# Patient Record
Sex: FEMALE | Race: WHITE | Employment: FULL TIME | URBAN - METROPOLITAN AREA
[De-identification: names, ages, dates, MRNs, and addresses within clinical notes are randomized per-mention and may not be internally consistent; named-entity substitution may affect disease eponyms.]

---

## 2017-01-03 ENCOUNTER — TELEPHONE (OUTPATIENT)
Dept: INTERNAL MEDICINE CLINIC | Facility: CLINIC | Age: 52
End: 2017-01-03

## 2017-01-03 DIAGNOSIS — B00.9 HERPES SIMPLEX: Primary | ICD-10-CM

## 2017-01-03 RX ORDER — VALACYCLOVIR HYDROCHLORIDE 1 G/1
1 TABLET, FILM COATED ORAL EVERY 12 HOURS SCHEDULED
Qty: 4 TABLET | Refills: 0 | Status: SHIPPED | OUTPATIENT
Start: 2017-01-03 | End: 2017-04-11

## 2017-01-03 NOTE — TELEPHONE ENCOUNTER
Spoke to pt. States she had hives that started Friday. Hives only on stomach and back of neck. Denies swelling of tongue or throat and no breathing difficulty. Hives resolved by Sunday. Pt takes Zyrtec daily.   Took Zantac 150mg on Sat and Sun as that

## 2017-01-03 NOTE — TELEPHONE ENCOUNTER
Patient had hives again this weekend - gone now. She bought zantac over the counter to take with her zyrtec. Also got a cold sore on her lip at the same time. Does she need to come in again since they are gone?   Please advise

## 2017-01-03 NOTE — TELEPHONE ENCOUNTER
Called and left message on cell number with below notes.   Informed pt Rx for Valacyclovir sent to [facility].

## 2017-01-26 PROBLEM — E05.00 GRAVES' DISEASE: Status: ACTIVE | Noted: 2017-01-26

## 2017-02-11 ENCOUNTER — APPOINTMENT (OUTPATIENT)
Dept: LAB | Age: 52
End: 2017-02-11
Attending: INTERNAL MEDICINE
Payer: COMMERCIAL

## 2017-02-11 PROCEDURE — 84439 ASSAY OF FREE THYROXINE: CPT | Performed by: INTERNAL MEDICINE

## 2017-02-11 PROCEDURE — 84443 ASSAY THYROID STIM HORMONE: CPT | Performed by: INTERNAL MEDICINE

## 2017-02-11 PROCEDURE — 36415 COLL VENOUS BLD VENIPUNCTURE: CPT | Performed by: INTERNAL MEDICINE

## 2017-04-04 ENCOUNTER — APPOINTMENT (OUTPATIENT)
Dept: LAB | Age: 52
End: 2017-04-04
Attending: INTERNAL MEDICINE
Payer: COMMERCIAL

## 2017-04-04 DIAGNOSIS — E05.00 GRAVES' DISEASE: ICD-10-CM

## 2017-04-04 PROCEDURE — 36415 COLL VENOUS BLD VENIPUNCTURE: CPT

## 2017-04-04 PROCEDURE — 84443 ASSAY THYROID STIM HORMONE: CPT

## 2017-04-11 PROBLEM — E89.0 POSTABLATIVE HYPOTHYROIDISM: Status: ACTIVE | Noted: 2017-04-11

## 2017-08-07 RX ORDER — FLUTICASONE PROPIONATE 50 MCG
SPRAY, SUSPENSION (ML) NASAL
Qty: 16 G | Refills: 0 | Status: SHIPPED | OUTPATIENT
Start: 2017-08-07 | End: 2017-09-23

## 2017-09-25 RX ORDER — FLUTICASONE PROPIONATE 50 MCG
SPRAY, SUSPENSION (ML) NASAL
Qty: 16 G | Refills: 0 | Status: SHIPPED | OUTPATIENT
Start: 2017-09-25 | End: 2017-11-24

## 2017-09-27 ENCOUNTER — TELEPHONE (OUTPATIENT)
Dept: OBGYN CLINIC | Facility: CLINIC | Age: 52
End: 2017-09-27

## 2017-09-27 DIAGNOSIS — Z12.39 SCREENING FOR BREAST CANCER: Primary | ICD-10-CM

## 2017-09-27 NOTE — TELEPHONE ENCOUNTER
PC  with patient. Told dr will sign the order for the mammogram and she can schedule tomorrow. Informed her she is due for physical. States she will call back when she has her schedule with her.

## 2017-09-27 NOTE — TELEPHONE ENCOUNTER
Patient needs a referral for her mammogram. Please call her and she will schedule it with [facility] location; [address] [address], [place].

## 2017-09-30 ENCOUNTER — APPOINTMENT (OUTPATIENT)
Dept: LAB | Age: 52
End: 2017-09-30
Attending: INTERNAL MEDICINE
Payer: COMMERCIAL

## 2017-09-30 DIAGNOSIS — E89.0 POSTABLATIVE HYPOTHYROIDISM: ICD-10-CM

## 2017-09-30 DIAGNOSIS — I10 ESSENTIAL HYPERTENSION: ICD-10-CM

## 2017-09-30 PROCEDURE — 84443 ASSAY THYROID STIM HORMONE: CPT

## 2017-09-30 PROCEDURE — 36415 COLL VENOUS BLD VENIPUNCTURE: CPT

## 2017-10-12 ENCOUNTER — HOSPITAL ENCOUNTER (OUTPATIENT)
Dept: MAMMOGRAPHY | Age: 52
Discharge: HOME | End: 2017-10-12
Attending: OBSTETRICS & GYNECOLOGY
Payer: COMMERCIAL

## 2017-10-12 DIAGNOSIS — Z12.39 SCREENING FOR BREAST CANCER: ICD-10-CM

## 2017-10-12 PROCEDURE — 77067 SCR MAMMO BI INCL CAD: CPT | Performed by: OBSTETRICS & GYNECOLOGY

## 2017-10-19 PROBLEM — E05.00 GRAVES' DISEASE: Status: RESOLVED | Noted: 2017-01-26 | Resolved: 2017-10-19

## 2017-11-11 ENCOUNTER — OFFICE VISIT (OUTPATIENT)
Dept: INTERNAL MEDICINE CLINIC | Facility: CLINIC | Age: 52
End: 2017-11-11

## 2017-11-11 VITALS
DIASTOLIC BLOOD PRESSURE: 60 MMHG | HEIGHT: 67 IN | TEMPERATURE: 99 F | HEART RATE: 76 BPM | SYSTOLIC BLOOD PRESSURE: 118 MMHG | RESPIRATION RATE: 12 BRPM | BODY MASS INDEX: 32.7 KG/M2 | WEIGHT: 208.31 LBS

## 2017-11-11 DIAGNOSIS — Z78.0 MENOPAUSE: ICD-10-CM

## 2017-11-11 DIAGNOSIS — Z00.00 ROUTINE PHYSICAL EXAMINATION: Primary | ICD-10-CM

## 2017-11-11 DIAGNOSIS — Z23 NEED FOR VACCINATION: ICD-10-CM

## 2017-11-11 DIAGNOSIS — Z12.11 COLON CANCER SCREENING: ICD-10-CM

## 2017-11-11 PROBLEM — Z92.3 S/P RADIOACTIVE IODINE THYROID ABLATION: Status: ACTIVE | Noted: 2017-11-11

## 2017-11-11 PROCEDURE — 90670 PCV13 VACCINE IM: CPT | Performed by: INTERNAL MEDICINE

## 2017-11-11 PROCEDURE — 90471 IMMUNIZATION ADMIN: CPT | Performed by: INTERNAL MEDICINE

## 2017-11-11 PROCEDURE — 99396 PREV VISIT EST AGE 40-64: CPT | Performed by: INTERNAL MEDICINE

## 2017-11-11 PROCEDURE — 90472 IMMUNIZATION ADMIN EACH ADD: CPT | Performed by: INTERNAL MEDICINE

## 2017-11-11 PROCEDURE — 90686 IIV4 VACC NO PRSV 0.5 ML IM: CPT | Performed by: INTERNAL MEDICINE

## 2017-11-11 NOTE — PROGRESS NOTES
HPI:   Abdullahi Child is a 46year old female who presents for a complete physical exam.  Pt adopted     Wt Readings from Last 6 Encounters:  11/11/17 : 208 lb 4.8 oz  10/19/17 : 207 lb  04/11/17 : 197 lb  01/26/17 : 202 lb  12/08/16 : 193 lb  11/23/16 : 1 Hyperthyroidism     managed by Dr. Rain Ag    • OCP (oral contraceptive pills) initiation    • RAD (reactive airway disease)    • Rib contusion    • Sinusitis       Past Surgical History:  12/28/2010: UPPER GI ENDOSCOPY,EXAM   Family History   Problem Rel supple,no adenopathy,no bruits, no thyromegaly, no thyroid nodules  CHEST: no chest tenderness  BREAST: no masses, axillary adenopathy, nipple discharge  LUNGS: clear to auscultation  CARDIO: [id number] no S3S4 without murmur  GI: Soft +BS NTND, no masses, no

## 2017-11-16 ENCOUNTER — HOSPITAL ENCOUNTER (OUTPATIENT)
Dept: BONE DENSITY | Age: 52
Discharge: HOME | End: 2017-11-16
Attending: INTERNAL MEDICINE
Payer: COMMERCIAL

## 2017-11-16 ENCOUNTER — LAB ENCOUNTER (OUTPATIENT)
Dept: LAB | Age: 52
End: 2017-11-16
Attending: INTERNAL MEDICINE
Payer: COMMERCIAL

## 2017-11-16 DIAGNOSIS — Z00.00 ROUTINE PHYSICAL EXAMINATION: ICD-10-CM

## 2017-11-16 DIAGNOSIS — Z78.0 MENOPAUSE: ICD-10-CM

## 2017-11-16 PROCEDURE — 77080 DXA BONE DENSITY AXIAL: CPT | Performed by: INTERNAL MEDICINE

## 2017-11-16 PROCEDURE — 83036 HEMOGLOBIN GLYCOSYLATED A1C: CPT

## 2017-11-16 PROCEDURE — 85025 COMPLETE CBC W/AUTO DIFF WBC: CPT

## 2017-11-16 PROCEDURE — 84443 ASSAY THYROID STIM HORMONE: CPT

## 2017-11-16 PROCEDURE — 84439 ASSAY OF FREE THYROXINE: CPT

## 2017-11-16 PROCEDURE — 82306 VITAMIN D 25 HYDROXY: CPT

## 2017-11-16 PROCEDURE — 82570 ASSAY OF URINE CREATININE: CPT

## 2017-11-16 PROCEDURE — 80053 COMPREHEN METABOLIC PANEL: CPT

## 2017-11-16 PROCEDURE — 80061 LIPID PANEL: CPT

## 2017-11-16 PROCEDURE — 36415 COLL VENOUS BLD VENIPUNCTURE: CPT

## 2017-11-16 PROCEDURE — 82043 UR ALBUMIN QUANTITATIVE: CPT

## 2017-11-25 RX ORDER — FLUTICASONE PROPIONATE 50 MCG
SPRAY, SUSPENSION (ML) NASAL
Qty: 16 G | Refills: 0 | Status: SHIPPED | OUTPATIENT
Start: 2017-11-25 | End: 2018-03-06

## 2017-11-30 ENCOUNTER — OFFICE VISIT (OUTPATIENT)
Dept: INTERNAL MEDICINE CLINIC | Facility: CLINIC | Age: 52
End: 2017-11-30

## 2017-11-30 VITALS
TEMPERATURE: 99 F | DIASTOLIC BLOOD PRESSURE: 88 MMHG | HEIGHT: 67 IN | SYSTOLIC BLOOD PRESSURE: 138 MMHG | HEART RATE: 80 BPM | BODY MASS INDEX: 32.43 KG/M2 | RESPIRATION RATE: 16 BRPM | WEIGHT: 206.63 LBS

## 2017-11-30 DIAGNOSIS — J06.9 ACUTE URI: Primary | ICD-10-CM

## 2017-11-30 PROCEDURE — 99213 OFFICE O/P EST LOW 20 MIN: CPT | Performed by: NURSE PRACTITIONER

## 2017-11-30 RX ORDER — AMOXICILLIN AND CLAVULANATE POTASSIUM 875; 125 MG/1; MG/1
1 TABLET, FILM COATED ORAL 2 TIMES DAILY
Qty: 20 TABLET | Refills: 0 | Status: SHIPPED | OUTPATIENT
Start: 2017-11-30 | End: 2017-12-10

## 2017-11-30 NOTE — PROGRESS NOTES
Patient presents with:  Cough: with SOB. Cough is dry. Using Pro air inhaler twice a day since Tuesday. Pt will be scheduling colonoscopy 3/18  Sinus Problem: Since Monday night.  PND.    Uses Flonase BID  Canker Sores      HPI:  Presents with approx Capsule Delayed Release Take 1 capsule (40 mg total) by mouth daily.  Disp: 30 capsule Rfl: 2   LEVOTHYROXINE SODIUM 125 MCG Oral Tab TAKE 1 TABLET DAILY Disp: 90 tablet Rfl: 3   LISINOPRIL 10 MG Oral Tab TAKE 1 TABLET DAILY Disp: 90 tablet Rfl: 1   PROAIR teaspoon salt in half cup of warm tap water. Gargle and spit. Use a humidifier in your room at night.      I highly recommend using a saline nasal wash device such as: SinuCleanse Nasal wash squeeze bottle, Neti-Pot, Neilmed nasal wash or similar device

## 2017-12-02 ENCOUNTER — OFFICE VISIT (OUTPATIENT)
Dept: OBGYN CLINIC | Facility: CLINIC | Age: 52
End: 2017-12-02

## 2017-12-02 VITALS
WEIGHT: 205 LBS | SYSTOLIC BLOOD PRESSURE: 118 MMHG | BODY MASS INDEX: 32.18 KG/M2 | HEIGHT: 67 IN | HEART RATE: 68 BPM | RESPIRATION RATE: 14 BRPM | TEMPERATURE: 98 F | DIASTOLIC BLOOD PRESSURE: 82 MMHG

## 2017-12-02 DIAGNOSIS — Z12.39 BREAST CANCER SCREENING: ICD-10-CM

## 2017-12-02 DIAGNOSIS — Z11.51 SCREENING FOR HUMAN PAPILLOMAVIRUS: ICD-10-CM

## 2017-12-02 DIAGNOSIS — Z01.419 ENCOUNTER FOR ANNUAL ROUTINE GYNECOLOGICAL EXAMINATION: Primary | ICD-10-CM

## 2017-12-02 PROCEDURE — 87624 HPV HI-RISK TYP POOLED RSLT: CPT | Performed by: OBSTETRICS & GYNECOLOGY

## 2017-12-02 PROCEDURE — 99396 PREV VISIT EST AGE 40-64: CPT | Performed by: OBSTETRICS & GYNECOLOGY

## 2017-12-02 PROCEDURE — 88175 CYTOPATH C/V AUTO FLUID REDO: CPT | Performed by: OBSTETRICS & GYNECOLOGY

## 2017-12-02 NOTE — PROGRESS NOTES
HPI:   Debbie Gonsalez is a 46year old [place] who presents for an annual gynecological exam.   Menses: No LMP recorded. Patient is postmenopausal. Menopause: postmenopausal   Hx of HSIL cone biopsy 12/2005. Last pap 7/2016 nl dx with graves.      Current O tobacco: Never Used                      Comment: quit 2013  Alcohol use: Yes           0.0 oz/week     Comment: 4 drinks per month        REVIEW OF SYSTEMS:   CONSTITUTIONAL: generally feels well   SKIN: denies any unusual skin lesions, rash, itchiness  H tenderness  ANUS: No lesions, no masses  PSYCHIATRIC: Patient oriented to time, place and person; mood and affect appropriate    DISCUSSION:  ·  I encouraged incorporating 4x weekly cardiovascular activity to maintain good physical health.   · We discussed

## 2017-12-18 ENCOUNTER — TELEPHONE (OUTPATIENT)
Dept: INTERNAL MEDICINE CLINIC | Facility: CLINIC | Age: 52
End: 2017-12-18

## 2017-12-18 DIAGNOSIS — B00.9 HERPES SIMPLEX: ICD-10-CM

## 2017-12-18 RX ORDER — VALACYCLOVIR HYDROCHLORIDE 1 G/1
1 TABLET, FILM COATED ORAL EVERY 12 HOURS SCHEDULED
Qty: 4 TABLET | Refills: 0 | Status: SHIPPED | OUTPATIENT
Start: 2017-12-18 | End: 2018-02-27 | Stop reason: ALTCHOICE

## 2017-12-18 NOTE — TELEPHONE ENCOUNTER
Patient called, she has been battling a cold/not feeling well for several weeks, came on at the end of November to see Rupinder Zaman. Now patient has a \"huge\" cold sore on upper lip which is swore and bothering her.  Patient wanted to know if we can prescribe ove

## 2017-12-18 NOTE — TELEPHONE ENCOUNTER
Noted below. Spoke with pt. Advised that medication already ordered, pt to take as directed. Pt voiced understanding.

## 2017-12-18 NOTE — TELEPHONE ENCOUNTER
Spoke with pt. States that \" cold sore\" on upper lip started Saturday is uncomfortable. States that she is using Abreva which helps a little with the discomfort but states that cold sore is getting bigger.    Advised that Dr. Enzo Rosas does not have OV availab

## 2017-12-19 RX ORDER — VALACYCLOVIR HYDROCHLORIDE 1 G/1
TABLET, FILM COATED ORAL
Qty: 4 TABLET | Refills: 0 | OUTPATIENT
Start: 2017-12-19

## 2018-02-27 ENCOUNTER — OFFICE VISIT (OUTPATIENT)
Dept: FAMILY MEDICINE CLINIC | Facility: CLINIC | Age: 53
End: 2018-02-27

## 2018-02-27 VITALS
SYSTOLIC BLOOD PRESSURE: 122 MMHG | TEMPERATURE: 99 F | RESPIRATION RATE: 18 BRPM | WEIGHT: 203.19 LBS | DIASTOLIC BLOOD PRESSURE: 60 MMHG | HEART RATE: 96 BPM | BODY MASS INDEX: 32 KG/M2 | OXYGEN SATURATION: 99 %

## 2018-02-27 DIAGNOSIS — L50.9 URTICARIA: Primary | ICD-10-CM

## 2018-02-27 PROCEDURE — 99213 OFFICE O/P EST LOW 20 MIN: CPT | Performed by: FAMILY MEDICINE

## 2018-02-27 RX ORDER — PREDNISONE 20 MG/1
TABLET ORAL
Qty: 15 TABLET | Refills: 0 | Status: SHIPPED | OUTPATIENT
Start: 2018-02-27 | End: 2018-11-16

## 2018-02-27 NOTE — PROGRESS NOTES
Claudia Ross is a 46year old female. S:  Patient presents today with the following concerns:  · Hives began 10 days ago in her ears and have now spread all over. They move around. Now on her legs, arms, abdomen, back, scalp. Very itchy.   Currently Essential hypertension     Postablative hypothyroidism     S/P radioactive iodine thyroid ablation     Encounter for annual routine gynecological examination    Family History   Problem Relation Age of Onset   • Adopted:  Yes   • Family history unknown: Yes 3 days. Take with food. Discussed side effects, adverse reactions, and expectations of medication. May continue benadryl at night and Claritin in the morning. Gentle skin care. Oatmeal baths. Use lukewarm water. Pat dry after bathing.   Go to the E

## 2018-02-27 NOTE — PATIENT INSTRUCTIONS
Understanding Hives (Urticaria)  Urticaria (hives) are red, itchy, and swollen areas on the skin. They are most often an allergic reaction from eating a food or taking a medicine. Sometimes the cause may be unknown.  A single hive can vary in size from a When to call your healthcare provider  Call your healthcare provider if:  · Your hives feel uncomfortable  · You have never had hives before  · Your symptoms don't go away or come back  · Your symptoms get worse or new symptoms develop such as:   Chey Loss You may be given medicines to relieve swelling and itching. Follow all instructions when using these medicines. The hives will usually fade in a few days, but can last up to 2 weeks.   Home care  Follow these tips:  · Try to find the cause of the hives and Call your healthcare provider right away if any of these occur:  · Fever of 100.4°F (38.0°C) or higher, or as directed by your healthcare provider  · Redness, swelling, or pain  · Foul-smelling fluid coming from the rash  Call 911  Call 911 if any of the f

## 2018-03-06 RX ORDER — FLUTICASONE PROPIONATE 50 MCG
SPRAY, SUSPENSION (ML) NASAL
Qty: 16 G | Refills: 1 | Status: SHIPPED | OUTPATIENT
Start: 2018-03-06 | End: 2018-06-12

## 2018-03-16 ENCOUNTER — OFFICE VISIT (OUTPATIENT)
Dept: FAMILY MEDICINE CLINIC | Facility: CLINIC | Age: 53
End: 2018-03-16

## 2018-03-16 VITALS
HEART RATE: 93 BPM | HEIGHT: 67 IN | OXYGEN SATURATION: 98 % | DIASTOLIC BLOOD PRESSURE: 74 MMHG | RESPIRATION RATE: 16 BRPM | BODY MASS INDEX: 31.55 KG/M2 | WEIGHT: 201 LBS | SYSTOLIC BLOOD PRESSURE: 116 MMHG | TEMPERATURE: 98 F

## 2018-03-16 DIAGNOSIS — L50.9 URTICARIA: Primary | ICD-10-CM

## 2018-03-16 PROCEDURE — 99213 OFFICE O/P EST LOW 20 MIN: CPT | Performed by: NURSE PRACTITIONER

## 2018-03-16 RX ORDER — PREDNISONE 20 MG/1
TABLET ORAL
Qty: 20 TABLET | Refills: 0 | Status: SHIPPED | OUTPATIENT
Start: 2018-03-16 | End: 2018-11-16

## 2018-03-16 NOTE — PROGRESS NOTES
CHIEF COMPLAINT:   Patient presents with:  Eyelid Swelling: Right eye swelling, started yesterday, forehead on Right also itchy - Hx of hives and bad allergies \"for years\" pt states, Possible reaction to Lisinopril         HPI:   Justin Billingsley is a 46 y LISINOPRIL 10 MG Oral Tab TAKE ONE TABLET BY MOUTH ONE TIME DAILY  Disp: 90 tablet Rfl: 0   LEVOTHYROXINE SODIUM 125 MCG Oral Tab TAKE 1 TABLET DAILY Disp: 90 tablet Rfl: 3   predniSONE 20 MG Oral Tab 2 tabs daily for 3 days, then 1 tablet daily for 3 days /74   Pulse 93   Temp 97.9 °F (36.6 °C) (Oral)   Resp 16   Ht 67\"   Wt 201 lb   SpO2 98%   Breastfeeding?  No   BMI 31.48 kg/m²   GENERAL: well developed, well nourished,in no apparent distress  SKIN: hives noted to right leg, left arm, right side of Urticaria (hives) are red, itchy, and swollen areas on the skin. They are most often an allergic reaction from eating a food or taking a medicine. Sometimes the cause may be unknown. A single hive can vary in size from a half inch to several inches.  Hives Call your healthcare provider if:  · Your hives feel uncomfortable  · You have never had hives before  · Your symptoms don't go away or come back  · Your symptoms get worse or new symptoms develop such as:   ¨ Sneezing, coughing, runny or stuffy nose  ¨ It

## 2018-03-16 NOTE — PATIENT INSTRUCTIONS
Understanding Hives (Urticaria)  Urticaria (hives) are red, itchy, and swollen areas on the skin. They are most often an allergic reaction from eating a food or taking a medicine. Sometimes the cause may be unknown.  A single hive can vary in size from a When to call your healthcare provider  Call your healthcare provider if:  · Your hives feel uncomfortable  · You have never had hives before  · Your symptoms don't go away or come back  · Your symptoms get worse or new symptoms develop such as:   Nicole Anderson

## 2018-03-21 ENCOUNTER — TELEPHONE (OUTPATIENT)
Dept: INTERNAL MEDICINE CLINIC | Facility: CLINIC | Age: 53
End: 2018-03-21

## 2018-03-21 NOTE — TELEPHONE ENCOUNTER
Pt has been to [facility] with hives, believes it may be due to lisinopril, would like to discuss changing this medication. Pt has no insurance so she is paying out of pocket for medications and so would prefer not to have to pay for office visit if possible.  Ave Macario

## 2018-03-21 NOTE — TELEPHONE ENCOUNTER
Spoke with pt. States that she continues to have problems with rash off/on. States that she went to [facility] on 3/16/18 with most recent rash and was put on Prednisone. See encounter 3/16/18.   Pt states that her rash is gone now that she is on Prednisone b

## 2018-03-21 NOTE — TELEPHONE ENCOUNTER
Spoke with pt . Advised as below that Lisinopril unlikely to be cause of rash. Pt asking many questions about when Lisinopril started. Pt states that she thought that Metoprolol and Lisinopril started at the same time. Reviewed Med History with pt.     P

## 2018-03-21 NOTE — TELEPHONE ENCOUNTER
See encounter 3/16/18. Noted that most recent rash started around 3/11/18. Pt states that she has had history of rash off/on. See encounter 2/27/18. Also see encounters 1/3/17 and 9/29/16.

## 2018-06-13 RX ORDER — FLUTICASONE PROPIONATE 50 MCG
SPRAY, SUSPENSION (ML) NASAL
Qty: 1 BOTTLE | Refills: 2 | Status: SHIPPED | OUTPATIENT
Start: 2018-06-13 | End: 2019-11-13

## 2018-11-16 PROBLEM — E55.9 VITAMIN D DEFICIENCY: Status: ACTIVE | Noted: 2018-11-16

## 2018-12-30 ENCOUNTER — APPOINTMENT (OUTPATIENT)
Dept: GENERAL RADIOLOGY | Age: 53
End: 2018-12-30
Attending: EMERGENCY MEDICINE

## 2018-12-30 ENCOUNTER — OFFICE VISIT (OUTPATIENT)
Dept: FAMILY MEDICINE CLINIC | Facility: CLINIC | Age: 53
End: 2018-12-30

## 2018-12-30 ENCOUNTER — HOSPITAL ENCOUNTER (EMERGENCY)
Age: 53
Discharge: HOME | End: 2018-12-30
Attending: EMERGENCY MEDICINE

## 2018-12-30 VITALS
SYSTOLIC BLOOD PRESSURE: 130 MMHG | TEMPERATURE: 97 F | WEIGHT: 215 LBS | HEIGHT: 67 IN | DIASTOLIC BLOOD PRESSURE: 88 MMHG | RESPIRATION RATE: 14 BRPM | BODY MASS INDEX: 33.74 KG/M2 | HEART RATE: 90 BPM | OXYGEN SATURATION: 97 %

## 2018-12-30 VITALS
SYSTOLIC BLOOD PRESSURE: 106 MMHG | DIASTOLIC BLOOD PRESSURE: 78 MMHG | WEIGHT: 217 LBS | BODY MASS INDEX: 34.06 KG/M2 | OXYGEN SATURATION: 96 % | TEMPERATURE: 98 F | HEIGHT: 67 IN | HEART RATE: 95 BPM

## 2018-12-30 DIAGNOSIS — L50.9 HIVES: Primary | ICD-10-CM

## 2018-12-30 DIAGNOSIS — J01.10 ACUTE NON-RECURRENT FRONTAL SINUSITIS: ICD-10-CM

## 2018-12-30 DIAGNOSIS — T78.40XA ALLERGIC REACTION, INITIAL ENCOUNTER: Primary | ICD-10-CM

## 2018-12-30 DIAGNOSIS — R22.0 LIP SWELLING: ICD-10-CM

## 2018-12-30 PROCEDURE — 96375 TX/PRO/DX INJ NEW DRUG ADDON: CPT

## 2018-12-30 PROCEDURE — 99284 EMERGENCY DEPT VISIT MOD MDM: CPT

## 2018-12-30 PROCEDURE — 71045 X-RAY EXAM CHEST 1 VIEW: CPT | Performed by: EMERGENCY MEDICINE

## 2018-12-30 PROCEDURE — S0028 INJECTION, FAMOTIDINE, 20 MG: HCPCS | Performed by: EMERGENCY MEDICINE

## 2018-12-30 PROCEDURE — 96374 THER/PROPH/DIAG INJ IV PUSH: CPT

## 2018-12-30 RX ORDER — FAMOTIDINE 10 MG/ML
20 INJECTION, SOLUTION INTRAVENOUS ONCE
Status: COMPLETED | OUTPATIENT
Start: 2018-12-30 | End: 2018-12-30

## 2018-12-30 RX ORDER — PREDNISONE 20 MG/1
40 TABLET ORAL DAILY
Qty: 10 TABLET | Refills: 0 | Status: SHIPPED | OUTPATIENT
Start: 2018-12-30 | End: 2019-01-04

## 2018-12-30 RX ORDER — METHYLPREDNISOLONE SODIUM SUCCINATE 125 MG/2ML
125 INJECTION, POWDER, LYOPHILIZED, FOR SOLUTION INTRAMUSCULAR; INTRAVENOUS ONCE
Status: COMPLETED | OUTPATIENT
Start: 2018-12-30 | End: 2018-12-30

## 2018-12-30 RX ORDER — AZITHROMYCIN 250 MG/1
250 TABLET, FILM COATED ORAL DAILY
Qty: 5 TABLET | Refills: 0 | Status: SHIPPED | OUTPATIENT
Start: 2018-12-30 | End: 2019-01-04

## 2018-12-30 RX ORDER — DIPHENHYDRAMINE HYDROCHLORIDE 50 MG/ML
12.5 INJECTION INTRAMUSCULAR; INTRAVENOUS ONCE
Status: COMPLETED | OUTPATIENT
Start: 2018-12-30 | End: 2018-12-30

## 2018-12-30 NOTE — ED NOTES
Because pt states that benadryl causes her to have palpitations - she was placed on telemetry and pulse ox to monitor any arrhythmia. MD gave order to adminster 12.5mg of benadryl to help with allergic reaction pt presented with.

## 2018-12-30 NOTE — PROGRESS NOTES
Deanne Conroy is a 48year old female who presents to [facility] with c/o hives, lip swelling. Pt notes 3-4 days of hives to back, abdomen and left side of face and ear. Left side upper lip swelling noted. No tongue, pharynx or uvula swelling.  Pt can tolerate se

## 2018-12-30 NOTE — ED INITIAL ASSESSMENT (HPI)
Hives to body, swelling to upper lip, since Thursday, c/o sinus and congestion as well. Unknown cause of hives.

## 2018-12-30 NOTE — ED PROVIDER NOTES
Patient Seen in: THE [facility] Emergency Department In [place]    History   Patient presents with:   Allergic Rxn Allergies (immune)  Cough/URI    Stated Complaint: hives to body, swelling to upper lip, since Thursday, c/o sinus and congestion *        53-[employer] Systems    Positive for stated complaint: hives to body, swelling to upper lip, since Thursday, c/o sinus and congestion *  Other systems are as noted in HPI. Constitutional and vital signs reviewed.       All other systems reviewed and negative except as congestion, chest congestion and a dry cough. On 12/27/18 she had onset of hives throughout her body and swelling to her upper lip. She denies any known cause. FINDINGS:  The lungs are clear.   Cardiomediastinal silhouette and vascularity are [place]

## 2018-12-30 NOTE — ED NOTES
So far pt denies any palpitation and her heart rhythm on the monitor remain at normal sinus rhythm. Pt was told to use her call light to ask for help should she have any concerns. She acknowledges and is currently resting comfortably and denies any issues.

## 2019-08-22 ENCOUNTER — NURSE ONLY (OUTPATIENT)
Dept: FAMILY MEDICINE CLINIC | Facility: CLINIC | Age: 54
End: 2019-08-22
Payer: COMMERCIAL

## 2019-08-22 VITALS
SYSTOLIC BLOOD PRESSURE: 122 MMHG | RESPIRATION RATE: 18 BRPM | HEIGHT: 67 IN | OXYGEN SATURATION: 96 % | DIASTOLIC BLOOD PRESSURE: 76 MMHG | TEMPERATURE: 98 F | WEIGHT: 213 LBS | HEART RATE: 92 BPM | BODY MASS INDEX: 33.43 KG/M2

## 2019-08-22 DIAGNOSIS — J30.9 ALLERGIC RHINITIS, UNSPECIFIED SEASONALITY, UNSPECIFIED TRIGGER: ICD-10-CM

## 2019-08-22 DIAGNOSIS — J45.20 MILD INTERMITTENT ASTHMA, UNSPECIFIED WHETHER COMPLICATED: ICD-10-CM

## 2019-08-22 DIAGNOSIS — J30.2 SEASONAL ALLERGIES: Primary | ICD-10-CM

## 2019-08-22 PROCEDURE — 99213 OFFICE O/P EST LOW 20 MIN: CPT | Performed by: NURSE PRACTITIONER

## 2019-08-22 RX ORDER — ALBUTEROL SULFATE 90 UG/1
2 AEROSOL, METERED RESPIRATORY (INHALATION) EVERY 6 HOURS PRN
Qty: 1 INHALER | Refills: 0 | Status: SHIPPED | OUTPATIENT
Start: 2019-08-22 | End: 2019-09-25 | Stop reason: ALTCHOICE

## 2019-08-22 RX ORDER — FLUTICASONE PROPIONATE 50 MCG
2 SPRAY, SUSPENSION (ML) NASAL DAILY
Qty: 1 BOTTLE | Refills: 0 | Status: SHIPPED | OUTPATIENT
Start: 2019-08-22 | End: 2019-09-16

## 2019-08-22 NOTE — PROGRESS NOTES
CHIEF COMPLAINT:   Patient presents with:  Nasal Congestion: headache, stuffy, cough x 4 days      HPI:   Merlin Pereyra is a 48year old female who presents for upper respiratory symptoms for  4 days.  Patient reports sore throat, congestion, dry cough, an • Other specified disorders of thyroid     Graves Disease   • RAD (reactive airway disease)    • Rib contusion    • Sinusitis    • Wears glasses    • Weight gain       Past Surgical History:   Procedure Laterality Date   • EGD     • UPPER GI ENDOSCOPY,EXAM Seasonal allergies  (primary encounter diagnosis)  Mild intermittent asthma, unspecified whether complicated  Allergic rhinitis, unspecified seasonality, unspecified trigger    PLAN:   Meds as below. Inhaler refilled.   Comfort care as described in Patient Pet dander:  · Do not have pets with fur and feathers. · If you can't avoid having a pet, keep it out of your bedroom and off upholstered furniture. Pollen:  · When pollen counts are high, keep windows of your car and home closed.  If possible, use an air The patient indicates understanding of these issues and agrees to the plan. The patient is asked to return if sx's persist or worsen.

## 2019-09-16 ENCOUNTER — HOSPITAL ENCOUNTER (OUTPATIENT)
Age: 54
Discharge: HOME | End: 2019-09-16
Payer: COMMERCIAL

## 2019-09-16 ENCOUNTER — APPOINTMENT (OUTPATIENT)
Dept: GENERAL RADIOLOGY | Age: 54
End: 2019-09-16
Attending: PHYSICIAN ASSISTANT
Payer: COMMERCIAL

## 2019-09-16 ENCOUNTER — NURSE ONLY (OUTPATIENT)
Dept: FAMILY MEDICINE CLINIC | Facility: CLINIC | Age: 54
End: 2019-09-16
Payer: COMMERCIAL

## 2019-09-16 VITALS
TEMPERATURE: 99 F | OXYGEN SATURATION: 96 % | DIASTOLIC BLOOD PRESSURE: 81 MMHG | RESPIRATION RATE: 18 BRPM | HEART RATE: 101 BPM | SYSTOLIC BLOOD PRESSURE: 144 MMHG

## 2019-09-16 VITALS
RESPIRATION RATE: 20 BRPM | OXYGEN SATURATION: 97 % | TEMPERATURE: 100 F | SYSTOLIC BLOOD PRESSURE: 108 MMHG | DIASTOLIC BLOOD PRESSURE: 78 MMHG | BODY MASS INDEX: 33.53 KG/M2 | HEART RATE: 102 BPM | HEIGHT: 67 IN | WEIGHT: 213.63 LBS

## 2019-09-16 DIAGNOSIS — R68.89 FLU-LIKE SYMPTOMS: ICD-10-CM

## 2019-09-16 DIAGNOSIS — Z02.9 ENCOUNTERS FOR ADMINISTRATIVE PURPOSES: ICD-10-CM

## 2019-09-16 DIAGNOSIS — R05.9 COUGH: Primary | ICD-10-CM

## 2019-09-16 DIAGNOSIS — R50.9 FEVER, UNSPECIFIED FEVER CAUSE: ICD-10-CM

## 2019-09-16 DIAGNOSIS — J45.41 MODERATE PERSISTENT REACTIVE AIRWAY DISEASE WITH ACUTE EXACERBATION: Primary | ICD-10-CM

## 2019-09-16 LAB
POCT INFLUENZA A: NEGATIVE
POCT INFLUENZA B: NEGATIVE

## 2019-09-16 PROCEDURE — 99214 OFFICE O/P EST MOD 30 MIN: CPT

## 2019-09-16 PROCEDURE — 94640 AIRWAY INHALATION TREATMENT: CPT

## 2019-09-16 PROCEDURE — 94640 AIRWAY INHALATION TREATMENT: CPT | Performed by: PHYSICIAN ASSISTANT

## 2019-09-16 PROCEDURE — 87502 INFLUENZA DNA AMP PROBE: CPT | Performed by: PHYSICIAN ASSISTANT

## 2019-09-16 PROCEDURE — 71045 X-RAY EXAM CHEST 1 VIEW: CPT | Performed by: PHYSICIAN ASSISTANT

## 2019-09-16 RX ORDER — ALBUTEROL SULFATE 90 UG/1
2 AEROSOL, METERED RESPIRATORY (INHALATION) EVERY 4 HOURS PRN
Qty: 1 INHALER | Refills: 0 | Status: SHIPPED | OUTPATIENT
Start: 2019-09-16 | End: 2019-09-25 | Stop reason: ALTCHOICE

## 2019-09-16 RX ORDER — ACETAMINOPHEN 500 MG
1000 TABLET ORAL ONCE
Status: COMPLETED | OUTPATIENT
Start: 2019-09-16 | End: 2019-09-16

## 2019-09-16 RX ORDER — IPRATROPIUM BROMIDE AND ALBUTEROL SULFATE 2.5; .5 MG/3ML; MG/3ML
3 SOLUTION RESPIRATORY (INHALATION) ONCE
Status: COMPLETED | OUTPATIENT
Start: 2019-09-16 | End: 2019-09-16

## 2019-09-16 RX ORDER — ALBUTEROL SULFATE 2.5 MG/3ML
2.5 SOLUTION RESPIRATORY (INHALATION) EVERY 4 HOURS PRN
Qty: 30 AMPULE | Refills: 0 | Status: SHIPPED | OUTPATIENT
Start: 2019-09-16 | End: 2019-10-01 | Stop reason: ALTCHOICE

## 2019-09-16 RX ORDER — PYRIDOXINE HCL (VITAMIN B6) 100 MG
TABLET ORAL
COMMUNITY

## 2019-09-16 RX ORDER — ACETAMINOPHEN 160 MG
2000 TABLET,DISINTEGRATING ORAL DAILY
COMMUNITY

## 2019-09-16 RX ORDER — DEXAMETHASONE SODIUM PHOSPHATE 4 MG/ML
10 VIAL (ML) INJECTION ONCE
Status: COMPLETED | OUTPATIENT
Start: 2019-09-16 | End: 2019-09-16

## 2019-09-16 RX ORDER — DOXYCYCLINE HYCLATE 100 MG/1
100 CAPSULE ORAL 2 TIMES DAILY
Qty: 20 CAPSULE | Refills: 0 | Status: SHIPPED | OUTPATIENT
Start: 2019-09-16 | End: 2019-09-26

## 2019-09-16 RX ORDER — PREDNISONE 20 MG/1
40 TABLET ORAL DAILY
Qty: 8 TABLET | Refills: 0 | Status: SHIPPED | OUTPATIENT
Start: 2019-09-16 | End: 2019-09-20

## 2019-09-16 RX ADMIN — IPRATROPIUM BROMIDE AND ALBUTEROL SULFATE 3 ML: 2.5; .5 SOLUTION RESPIRATORY (INHALATION) at 17:34:00

## 2019-09-16 NOTE — ED INITIAL ASSESSMENT (HPI)
Sent here from [facility] for eval of cough and chest congestion. Received a neb treatment there w/ some improvement. Cough is productive at time.

## 2019-09-16 NOTE — PROGRESS NOTES
CHIEF COMPLAINT:   Patient presents with:  Cough: flem, shortness of breath, sore throat, nasal congestion x yesterday morning        HPI:   Danni Smith is a 48year old female who presents for cough for 2 days.  Started with sore throat. +nasal congesti disease)     chronic heartburn   • Hypercholesteremia    • Hyperthyroidism     managed by Dr. Mary Beth Morrison    • OCP (oral contraceptive pills) initiation    • Other specified disorders of thyroid     Graves Disease   • RAD (reactive airway disease)    • Rib co with improvement. Pulse ox from 95 to 97%.  patient still has diffuse wheeze and crackles on exam. Patient would like xray    Rationale: needs chest xray   Site recommendation: [facility]   Patient/parent verbalized understanding of rationale for furthe

## 2019-09-16 NOTE — ED PROVIDER NOTES
Patient Seen in: [facility]    History   Patient presents with:  Cough/URI    Stated Complaint: cough and sore throat 2 days    HPI    70-year-old female with a history of asthma here with complaint of productive cough with feve Systems    Positive for stated complaint: cough and sore throat 2 days  Other systems are as noted in HPI. Constitutional and vital signs reviewed. All other systems reviewed and negative except as noted above.     Physical Exam     ED Triage Vitals PORTABLE  (CPT=71045), 12/30/2018, 10:23. INDICATIONS:  cough and sore throat 2 days  PATIENT STATED HISTORY: (As transcribed by Technologist)  The patient states that she has had a slightly productive cough, shortness of breath, and fever for one day. 0    Doxycycline Hyclate 100 MG Oral Cap  Take 1 capsule (100 mg total) by mouth 2 (two) times daily for 10 days.   Qty: 20 capsule Refills: 0    albuterol sulfate (2.5 MG/3ML) 0.083% Inhalation Nebu Soln  Take 3 mL (2.5 mg total) by nebulization every 4 (f

## 2019-09-25 ENCOUNTER — OFFICE VISIT (OUTPATIENT)
Dept: OBGYN CLINIC | Facility: CLINIC | Age: 54
End: 2019-09-25
Payer: COMMERCIAL

## 2019-09-25 ENCOUNTER — HOSPITAL ENCOUNTER (OUTPATIENT)
Dept: MAMMOGRAPHY | Age: 54
Discharge: HOME | End: 2019-09-25
Attending: OBSTETRICS & GYNECOLOGY
Payer: COMMERCIAL

## 2019-09-25 VITALS
SYSTOLIC BLOOD PRESSURE: 122 MMHG | WEIGHT: 214 LBS | HEIGHT: 67 IN | BODY MASS INDEX: 33.59 KG/M2 | DIASTOLIC BLOOD PRESSURE: 72 MMHG | HEART RATE: 82 BPM

## 2019-09-25 DIAGNOSIS — Z12.4 SCREENING FOR MALIGNANT NEOPLASM OF CERVIX: ICD-10-CM

## 2019-09-25 DIAGNOSIS — Z12.31 VISIT FOR SCREENING MAMMOGRAM: ICD-10-CM

## 2019-09-25 DIAGNOSIS — Z11.51 SCREENING FOR HUMAN PAPILLOMAVIRUS: ICD-10-CM

## 2019-09-25 DIAGNOSIS — Z01.419 ENCOUNTER FOR ANNUAL ROUTINE GYNECOLOGICAL EXAMINATION: Primary | ICD-10-CM

## 2019-09-25 PROCEDURE — 88175 CYTOPATH C/V AUTO FLUID REDO: CPT | Performed by: OBSTETRICS & GYNECOLOGY

## 2019-09-25 PROCEDURE — 77063 BREAST TOMOSYNTHESIS BI: CPT | Performed by: OBSTETRICS & GYNECOLOGY

## 2019-09-25 PROCEDURE — 87624 HPV HI-RISK TYP POOLED RSLT: CPT | Performed by: OBSTETRICS & GYNECOLOGY

## 2019-09-25 PROCEDURE — 99396 PREV VISIT EST AGE 40-64: CPT | Performed by: OBSTETRICS & GYNECOLOGY

## 2019-09-25 PROCEDURE — 77067 SCR MAMMO BI INCL CAD: CPT | Performed by: OBSTETRICS & GYNECOLOGY

## 2019-09-25 NOTE — PROGRESS NOTES
HPI:   Claudia Ross is a 48year old  who presents for an annual gynecological exam.   Menses: No LMP recorded. Patient is postmenopausal. Menopause: postmenopausal  Had Graves' disease and underwent radioactive iodine 2 years ago.   Sees endocrino • OCP (oral contraceptive pills) initiation    • Other specified disorders of thyroid     Graves Disease   • RAD (reactive airway disease)    • Rib contusion    • Sinusitis    • Wears glasses    • Weight gain       Past Surgical History:   Procedure Lat throughout  ABD: Soft, nontender and not distended, no masses, no hernia  EXTREMITIES: No edema  EXTERNAL GENITALIA: Normal appearance for age, no lesions, normal hair distribution for age  URETHRA: No masses or tenderness, no prolapse  VAGINA: Normal, phy and screening blood work current, ordered by primary MD.  Fanta Shown 1 year or PRN.     Diagnoses and all orders for this visit:    Encounter for annual routine gynecological examination    Screening for malignant neoplasm of cervix  -     THINPREP PAP SMEAR B; Fu

## 2019-09-26 LAB — HPV I/H RISK 1 DNA SPEC QL NAA+PROBE: NEGATIVE

## 2019-10-01 ENCOUNTER — OFFICE VISIT (OUTPATIENT)
Dept: INTERNAL MEDICINE CLINIC | Facility: CLINIC | Age: 54
End: 2019-10-01
Payer: COMMERCIAL

## 2019-10-01 VITALS
OXYGEN SATURATION: 95 % | DIASTOLIC BLOOD PRESSURE: 84 MMHG | WEIGHT: 217.19 LBS | SYSTOLIC BLOOD PRESSURE: 120 MMHG | TEMPERATURE: 98 F | HEIGHT: 67 IN | BODY MASS INDEX: 34.09 KG/M2 | HEART RATE: 88 BPM | RESPIRATION RATE: 14 BRPM

## 2019-10-01 DIAGNOSIS — Z00.00 ENCOUNTER FOR ANNUAL PHYSICAL EXAM: Primary | ICD-10-CM

## 2019-10-01 DIAGNOSIS — I10 ESSENTIAL HYPERTENSION: ICD-10-CM

## 2019-10-01 DIAGNOSIS — E55.9 VITAMIN D DEFICIENCY: ICD-10-CM

## 2019-10-01 DIAGNOSIS — E78.00 HYPERCHOLESTEREMIA: ICD-10-CM

## 2019-10-01 DIAGNOSIS — R40.0 DAYTIME SOMNOLENCE: ICD-10-CM

## 2019-10-01 DIAGNOSIS — K21.9 GASTROESOPHAGEAL REFLUX DISEASE, ESOPHAGITIS PRESENCE NOT SPECIFIED: ICD-10-CM

## 2019-10-01 DIAGNOSIS — E66.9 OBESITY (BMI 30-39.9): ICD-10-CM

## 2019-10-01 DIAGNOSIS — Z91.018 FOOD ALLERGY: ICD-10-CM

## 2019-10-01 DIAGNOSIS — L98.9 SKIN LESION: ICD-10-CM

## 2019-10-01 DIAGNOSIS — E89.0 POSTABLATIVE HYPOTHYROIDISM: ICD-10-CM

## 2019-10-01 DIAGNOSIS — Z11.59 ENCOUNTER FOR HCV SCREENING TEST FOR LOW RISK PATIENT: ICD-10-CM

## 2019-10-01 PROCEDURE — 99396 PREV VISIT EST AGE 40-64: CPT | Performed by: INTERNAL MEDICINE

## 2019-10-01 PROCEDURE — 90732 PPSV23 VACC 2 YRS+ SUBQ/IM: CPT | Performed by: INTERNAL MEDICINE

## 2019-10-01 PROCEDURE — 90471 IMMUNIZATION ADMIN: CPT | Performed by: INTERNAL MEDICINE

## 2019-10-01 NOTE — PROGRESS NOTES
[facility] Annual Physical Exam Note    HPI:    Patient ID: Estevan Infante is a 48year old female. Chief Complaint: Physical (sees gyne, overdue for colonscopy, mammo 9/25/19, pap: 12/2/17);  Imm/Inj (pneumovax 23, wants to talk to dr. mireya munroe Patient also feels that she is very tired throughout the day. She has been taking vitamin B supplements without any relief. She reports that she tosses and turns throughout the night and wakes up frequently to urinate.   She is unsure if she snores or if Pneumococcal PPSV23 Medium Risk Adult(1 of 1 - PPSV23) due on 01/06/2018  Annual Depression Screen due on 11/30/2018  Influenza Vaccine(1) due on 09/01/2019    Colonoscopy (50+): due, working with Dr. Benny Michele (36-46): Done 9/25/19, needs addition Feeling down, depressed, or hopeless (over the last two weeks)?: Not at all    PHQ-2 SCORE: 0          Review of Systems:     Constitutional: Positive for fatigue. Negative for activity change, appetite change, chills, fever and unexpected weight change. Omeprazole 40 MG Oral Capsule Delayed Release Take 1 capsule (40 mg total) by mouth daily. Disp: 90 capsule Rfl: 1   loratadine 10 MG Oral Tab Take 10 mg by mouth daily.  Disp:  Rfl:    PROAIR  (90 BASE) MCG/ACT Inhalation Aero Soln INHALE 1-2 PUFFS Packs/day: 0.50        Years: 28.00        Pack years: 14        Types: Cigarettes      Smokeless tobacco: Never Used      Tobacco comment: quit 2013    Alcohol use: Not Currently      Alcohol/week: 0.0 standard drinks    Drug use: No         Antihis Neurological: She is alert and oriented to person, place, and time. She displays normal reflexes. No cranial nerve deficit, sensory deficit or motor deficit. Coordination normal.   Skin: Skin is warm and dry. Rash (L shoulder blue/red lesion ) noted.  She i Systolic Blood Pressure: 171 mmHg      Is BP treated: Yes      HDL Cholesterol: 62 mg/dL      Total Cholesterol: 218 mg/dL      Imaging:   Pertinent imaging results reviewed.    Xr Chest Ap/pa (1 View) (cpt=71045)    Result Date: 9/16/2019  PROCEDURE: PROCEDURE:  KYLE JENNIFER 2D+3D SCREENING BILAT (CPT=77067/64634)  COMPARISON:  KYLE Bergman DIGITAL SCRN BILAT W/CAD (CPT=/77O52), 9/17/2016, 9:07. KYLE LYNN DIG DIAGNOSTIC BILATERAL W/CAD (ZYI=/40133), 7/03/2015, 9:25.   [address] [place] [address] -     HEMOGLOBIN A1C; Future  -     OP REFERRAL TO DIAGNOSTIC SLEEP STUDY  -     GENERAL SLEEP STUDY TRANSCRIPTION;  Future    Postablative hypothyroidism  -     TSH+FREE T4; Future    Vitamin D deficiency  -     VITAMIN D, 25-HYDROXY; Future    Gastroesoph Patient has a history of hypothyroidism and she follows with an endocrinologist.  Will check TSH level to ensure adequacy of treatment. Patient takes her medications as prescribed.   She takes her Synthroid early in the morning without any other medication Patient asked to sign release of information from prior physicians/outside consultants for review if not already requested, make future office/imaging appointments at the  prior to leaving, and to sign up for [facility] if not already active.   Preve

## 2019-10-04 ENCOUNTER — TELEPHONE (OUTPATIENT)
Dept: INTERNAL MEDICINE CLINIC | Facility: CLINIC | Age: 54
End: 2019-10-04

## 2019-10-04 NOTE — TELEPHONE ENCOUNTER
Per patient request, faxed lab orders dated 10/1/19 to [facility] at [phone]

## 2019-10-04 NOTE — TELEPHONE ENCOUNTER
Spoke with pt to apologize for confusion as [facility] is documented as pt's Preferred Lab. Asked if would like to change [facility] to [facility]. Pt is unsure if insurance covers going to [facility] or [facility], but will call insurance to double check.   Pt requ

## 2019-10-04 NOTE — TELEPHONE ENCOUNTER
Pt calling regarding labs stated she spoke to someone and they were going to reorder her labs to [facility]? Pt stated she has been fasting and at [facility] now requesting labs to be reordered there. Nurse line was busy.  Pt is requesting a call back today may go an

## 2019-10-08 ENCOUNTER — TELEPHONE (OUTPATIENT)
Dept: INTERNAL MEDICINE CLINIC | Facility: CLINIC | Age: 54
End: 2019-10-08

## 2019-10-08 DIAGNOSIS — R40.0 DAYTIME SOMNOLENCE: Primary | ICD-10-CM

## 2019-10-08 NOTE — TELEPHONE ENCOUNTER
Patient's insurance denied referral for sleep study. Order for home sleep study pended, please advise.

## 2019-10-08 NOTE — TELEPHONE ENCOUNTER
Patient calling stated her insurance declined her sleep study. Pt requesting a new order for at home sleep study to see if her insurance would approve this. Please advise. Thank you!

## 2019-10-09 PROBLEM — R73.03 PREDIABETES: Status: ACTIVE | Noted: 2019-10-09

## 2019-10-09 NOTE — TELEPHONE ENCOUNTER
Spoke with pt, advised home sleep study ordered, still open (not yet approved by insurance.) She will let sleep facility know.

## 2019-10-10 NOTE — PROGRESS NOTES
Spoke to patient, aware of results and recommendations. Patient states she will be seeing an allergist due to the concerns of rashes when eating raw fruits and vegetables. Discussed recommendations per Dr. Juancarlos Balbuena note below.  Patient agreeable to metformin

## 2019-10-16 ENCOUNTER — HOSPITAL ENCOUNTER (OUTPATIENT)
Dept: MAMMOGRAPHY | Age: 54
Discharge: HOME | End: 2019-10-16
Attending: OBSTETRICS & GYNECOLOGY
Payer: COMMERCIAL

## 2019-10-16 ENCOUNTER — HOSPITAL ENCOUNTER (OUTPATIENT)
Dept: ULTRASOUND IMAGING | Age: 54
Discharge: HOME | End: 2019-10-16
Attending: OBSTETRICS & GYNECOLOGY
Payer: COMMERCIAL

## 2019-10-16 DIAGNOSIS — R92.2 INCONCLUSIVE MAMMOGRAM: ICD-10-CM

## 2019-10-16 PROCEDURE — 77061 BREAST TOMOSYNTHESIS UNI: CPT | Performed by: OBSTETRICS & GYNECOLOGY

## 2019-10-16 PROCEDURE — 77065 DX MAMMO INCL CAD UNI: CPT | Performed by: OBSTETRICS & GYNECOLOGY

## 2019-10-16 PROCEDURE — 76642 ULTRASOUND BREAST LIMITED: CPT | Performed by: OBSTETRICS & GYNECOLOGY

## 2019-10-23 ENCOUNTER — TELEPHONE (OUTPATIENT)
Dept: OBGYN CLINIC | Facility: CLINIC | Age: 54
End: 2019-10-23

## 2019-10-23 NOTE — TELEPHONE ENCOUNTER
----- Message from Jerica Arias sent at 10/22/2019 11:25 AM CDT -----  Regarding: returning carlos  Returning call     Patient wants to wait  for Jailyn KWON  [birth year]

## 2019-10-29 ENCOUNTER — OFFICE VISIT (OUTPATIENT)
Dept: INTERNAL MEDICINE CLINIC | Facility: CLINIC | Age: 54
End: 2019-10-29
Payer: COMMERCIAL

## 2019-10-29 VITALS
WEIGHT: 213.63 LBS | HEART RATE: 83 BPM | DIASTOLIC BLOOD PRESSURE: 76 MMHG | SYSTOLIC BLOOD PRESSURE: 119 MMHG | OXYGEN SATURATION: 95 % | HEIGHT: 67 IN | TEMPERATURE: 98 F | RESPIRATION RATE: 16 BRPM | BODY MASS INDEX: 33.53 KG/M2

## 2019-10-29 DIAGNOSIS — R42 DISEQUILIBRIUM: ICD-10-CM

## 2019-10-29 DIAGNOSIS — Z23 NEED FOR VACCINATION: ICD-10-CM

## 2019-10-29 DIAGNOSIS — R73.03 PREDIABETES: Primary | ICD-10-CM

## 2019-10-29 DIAGNOSIS — Z23 NEED FOR INFLUENZA VACCINATION: ICD-10-CM

## 2019-10-29 DIAGNOSIS — Z91.018 HISTORY OF ORAL ALLERGY SYNDROME: ICD-10-CM

## 2019-10-29 DIAGNOSIS — Z91.89 AT RISK FOR SLEEP APNEA: ICD-10-CM

## 2019-10-29 DIAGNOSIS — M15.1 HEBERDEN'S NODES (WITH ARTHROPATHY): ICD-10-CM

## 2019-10-29 PROCEDURE — 90471 IMMUNIZATION ADMIN: CPT | Performed by: INTERNAL MEDICINE

## 2019-10-29 PROCEDURE — 90686 IIV4 VACC NO PRSV 0.5 ML IM: CPT | Performed by: INTERNAL MEDICINE

## 2019-10-29 PROCEDURE — 99214 OFFICE O/P EST MOD 30 MIN: CPT | Performed by: INTERNAL MEDICINE

## 2019-10-29 NOTE — PROGRESS NOTES
Established Patient Progress Note  Chief Complaint:   Patient presents with: Follow - Up: Disequilibrium and review labs.       HPI:   This is a 47year old female with past medical history of hypertension, hyperlipidemia, asthma, hypothyroidism, GERD, vit mouth daily with breakfast., Disp: 90 tablet, Rfl: 2  metroNIDAZOLE 0.75 % External Gel, Apply 1 Application topically 2 (two) times daily. , Disp: 45 g, Rfl: 3  NON FORMULARY, Take 1 capsule by mouth daily.  Synbiotic 365, Disp: , Rfl:   NON FORMULARY, Take Cardiovascular: Normal rate, regular rhythm, normal heart sounds and intact distal pulses. Exam reveals no gallop. No murmur heard. Edema not present. Pulmonary/Chest: Effort normal and breath sounds normal.   Abdominal: Soft.  Bowel sounds are nor Influenza vaccination given without any complications. Also evaluated patient for heberden's nodes found on examination. Recommended patient take advil and tylenol prn for joint pain and consider a trial of glucosamine/chondroitin.     Orders Placed

## 2019-10-29 NOTE — PATIENT INSTRUCTIONS
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

## 2019-10-30 ENCOUNTER — TELEPHONE (OUTPATIENT)
Dept: INTERNAL MEDICINE CLINIC | Facility: CLINIC | Age: 54
End: 2019-10-30

## 2019-10-30 NOTE — TELEPHONE ENCOUNTER
Incoming (mail or fax):  Fax  Received from: [facility]   Documentation given to: Triage

## 2019-10-30 NOTE — TELEPHONE ENCOUNTER
Per Dr. Alfonza Klinefelter, called patient to confirm which Allergist she saw and if she could call our office with the information so we could obtain medical records.

## 2019-10-30 NOTE — TELEPHONE ENCOUNTER
Pt returned called stated she sees [facility] in [place] on [address]. PH: [phone].

## 2019-10-31 ENCOUNTER — MED REC SCAN ONLY (OUTPATIENT)
Dept: INTERNAL MEDICINE CLINIC | Facility: CLINIC | Age: 54
End: 2019-10-31

## 2019-11-12 ENCOUNTER — TELEPHONE (OUTPATIENT)
Dept: INTERNAL MEDICINE CLINIC | Facility: CLINIC | Age: 54
End: 2019-11-12

## 2019-11-12 DIAGNOSIS — R73.03 PREDIABETES: ICD-10-CM

## 2019-11-12 RX ORDER — METFORMIN HYDROCHLORIDE 500 MG/1
500 TABLET, EXTENDED RELEASE ORAL
Qty: 90 TABLET | Refills: 2 | Status: SHIPPED | OUTPATIENT
Start: 2019-11-12 | End: 2020-08-05

## 2019-11-12 NOTE — TELEPHONE ENCOUNTER
Spoke to pt. Pt voiced agreeable to lower dose. New rx sent to preferred pharmacy. Pt voiced understanding.

## 2019-11-12 NOTE — TELEPHONE ENCOUNTER
Patient is still having some loose stools and abdominal cramping with Metformin. She said Dr Mamta Warren might decrease her dosage. Please advise. Thank you!

## 2019-11-13 DIAGNOSIS — J30.9 ALLERGIC RHINITIS, UNSPECIFIED SEASONALITY, UNSPECIFIED TRIGGER: Primary | ICD-10-CM

## 2019-11-13 RX ORDER — FLUTICASONE PROPIONATE 50 MCG
SPRAY, SUSPENSION (ML) NASAL
Qty: 3 BOTTLE | Refills: 1 | Status: SHIPPED | OUTPATIENT
Start: 2019-11-13 | End: 2020-05-05

## 2019-11-19 ENCOUNTER — OFFICE VISIT (OUTPATIENT)
Dept: SLEEP CENTER | Age: 54
End: 2019-11-19
Attending: INTERNAL MEDICINE
Payer: COMMERCIAL

## 2019-11-19 DIAGNOSIS — R40.0 DAYTIME SOMNOLENCE: ICD-10-CM

## 2019-11-19 PROCEDURE — 95806 SLEEP STUDY UNATT&RESP EFFT: CPT

## 2019-11-25 NOTE — PROCEDURES
[address]       Accredited by the [facility] ([facility])    PATIENT'S NAME:        Jemma Cristóbal  ATTENDING PHYSICIAN:   Marisol Robb M.D. REFERRING PHYSICIAN:   Conor Canales M.D.   P nasal CPAP, consideration for a dental device, or evaluation for upper airway surgery. The patient should avoid the use of alcohol and sedative medications which can worsen obstructive sleep apnea.   The patient should avoid driving or operating machinery

## 2019-11-25 NOTE — PROGRESS NOTES
Spoke to pt, aware of results to be addressed by pulmonologist. Made aware CPAP coordinator to follow-up with pt. Pt voiced understanding.

## 2019-12-03 PROBLEM — T78.1XXA ORAL ALLERGY SYNDROME: Status: ACTIVE | Noted: 2019-12-03

## 2019-12-10 PROBLEM — E11.9 TYPE 2 DIABETES MELLITUS WITHOUT COMPLICATION, WITHOUT LONG-TERM CURRENT USE OF INSULIN (HCC): Status: ACTIVE | Noted: 2019-12-10

## 2020-01-11 ENCOUNTER — OFFICE VISIT (OUTPATIENT)
Dept: FAMILY MEDICINE CLINIC | Facility: CLINIC | Age: 55
End: 2020-01-11
Payer: COMMERCIAL

## 2020-01-11 VITALS
SYSTOLIC BLOOD PRESSURE: 106 MMHG | TEMPERATURE: 99 F | DIASTOLIC BLOOD PRESSURE: 74 MMHG | WEIGHT: 205.38 LBS | OXYGEN SATURATION: 94 % | BODY MASS INDEX: 32.24 KG/M2 | HEART RATE: 90 BPM | HEIGHT: 67 IN | RESPIRATION RATE: 16 BRPM

## 2020-01-11 DIAGNOSIS — J01.00 ACUTE MAXILLARY SINUSITIS, RECURRENCE NOT SPECIFIED: Primary | ICD-10-CM

## 2020-01-11 DIAGNOSIS — L50.9 URTICARIA: ICD-10-CM

## 2020-01-11 DIAGNOSIS — J98.01 ACUTE BRONCHOSPASM: ICD-10-CM

## 2020-01-11 PROCEDURE — 99213 OFFICE O/P EST LOW 20 MIN: CPT | Performed by: NURSE PRACTITIONER

## 2020-01-11 RX ORDER — PREDNISONE 20 MG/1
60 TABLET ORAL DAILY
Qty: 21 TABLET | Refills: 0 | Status: SHIPPED | OUTPATIENT
Start: 2020-01-11 | End: 2020-01-16

## 2020-01-11 RX ORDER — DOXYCYCLINE HYCLATE 100 MG/1
100 CAPSULE ORAL 2 TIMES DAILY
Qty: 20 CAPSULE | Refills: 0 | Status: SHIPPED | OUTPATIENT
Start: 2020-01-11 | End: 2020-01-21

## 2020-01-13 NOTE — PROGRESS NOTES
CHIEF COMPLAINT:   Patient presents with:  Sinus Problem: Sinus pressure, chest congestion, dry cough  X 2 weeks,  itchy ears x 1 day. No NV fever or chills.        HPI:   Sarina Martin is a 47year old female who presents for upper respiratory symptoms for 3350/ELECTROLYTES) 240 g Oral Recon Soln Take as directed by physician. 4000 mL 0   • Omeprazole 40 MG Oral Capsule Delayed Release Take 1 capsule (40 mg total) by mouth daily.  90 capsule 1   • metroNIDAZOLE 0.75 % External Gel Apply 1 Application topicall BMI 32.17 kg/m²   GENERAL: well developed, well nourished, and in no apparent distress, afebrile  SKIN: no rashes, no suspicious lesions  HEAD: atraumatic, normocephalic.  mild tenderness on palpation of maxillary > frontal sinuses.   EYES: conjunctiva vijay

## 2020-02-04 RX ORDER — ALBUTEROL SULFATE 90 UG/1
AEROSOL, METERED RESPIRATORY (INHALATION)
Qty: 6.7 INHALER | Refills: 0 | OUTPATIENT
Start: 2020-02-04

## 2020-02-21 RX ORDER — ALBUTEROL SULFATE 90 UG/1
AEROSOL, METERED RESPIRATORY (INHALATION)
Qty: 1 INHALER | Refills: 0 | Status: SHIPPED | OUTPATIENT
Start: 2020-02-21 | End: 2020-10-02

## 2020-04-16 ENCOUNTER — TELEPHONE (OUTPATIENT)
Dept: INTERNAL MEDICINE CLINIC | Facility: CLINIC | Age: 55
End: 2020-04-16

## 2020-04-16 NOTE — TELEPHONE ENCOUNTER
Virtual/Telephone Check-In    Angelina Peacock verbally consents to a Virtual/Telephone Check-In service on 4-26-20  Patient understands and accepts financial responsibility for any deductible, co-insurance and/or co-pays associated with this service.     Cee

## 2020-04-28 ENCOUNTER — VIRTUAL PHONE E/M (OUTPATIENT)
Dept: INTERNAL MEDICINE CLINIC | Facility: CLINIC | Age: 55
End: 2020-04-28
Payer: COMMERCIAL

## 2020-04-28 DIAGNOSIS — R73.03 PREDIABETES: Primary | ICD-10-CM

## 2020-04-28 DIAGNOSIS — G47.33 OSA ON CPAP: ICD-10-CM

## 2020-04-28 DIAGNOSIS — I10 ESSENTIAL HYPERTENSION: ICD-10-CM

## 2020-04-28 DIAGNOSIS — Z99.89 OSA ON CPAP: ICD-10-CM

## 2020-04-28 DIAGNOSIS — E89.0 POSTABLATIVE HYPOTHYROIDISM: ICD-10-CM

## 2020-04-28 PROCEDURE — 99213 OFFICE O/P EST LOW 20 MIN: CPT | Performed by: INTERNAL MEDICINE

## 2020-04-28 NOTE — PROGRESS NOTES
Established Patient Progress Note  Chief Complaint:   Patient presents with:   Follow - Up: Pre-diabetes      HPI:   This is a 47year old female with PMH Pre-DM, HTN, obesity, NICOLAS on CPAP, Asthma, Grave's s/p post ablation now in Hypothyroid state, GERD, P congestion;             headaches; post nasal drainage; hoarseness; throat             clearing.   Mold                    RASH, ITCHING    Comment:Runny /itchy nose, sneezing , nasal congestion;             headaches; post nasal drainage; hoarseness; throa kg/m² as calculated from the following:    Height as of 3/17/20: 67\". Weight as of 3/17/20: 204 lb (92.5 kg). Note that given the current COVID-19 pandemic, this was a telephone encounter and a focused clinical examination could not be performed. examination     Vitamin D deficiency     Prediabetes     Oral allergy syndrome     NICOLAS on CPAP        Claudette Ales, MD

## 2020-05-04 DIAGNOSIS — J30.9 ALLERGIC RHINITIS, UNSPECIFIED SEASONALITY, UNSPECIFIED TRIGGER: ICD-10-CM

## 2020-05-05 RX ORDER — FLUTICASONE PROPIONATE 50 MCG
SPRAY, SUSPENSION (ML) NASAL
Qty: 48 G | Refills: 1 | Status: SHIPPED | OUTPATIENT
Start: 2020-05-05 | End: 2020-10-29

## 2020-07-18 ENCOUNTER — LAB ENCOUNTER (OUTPATIENT)
Dept: LAB | Facility: HOSPITAL | Age: 55
End: 2020-07-18
Attending: INTERNAL MEDICINE
Payer: COMMERCIAL

## 2020-07-18 DIAGNOSIS — Z01.818 OTHER SPECIFIED PRE-OPERATIVE EXAMINATION: ICD-10-CM

## 2020-07-19 LAB — SARS-COV-2 RNA RESP QL NAA+PROBE: NOT DETECTED

## 2020-07-21 ENCOUNTER — APPOINTMENT (OUTPATIENT)
Dept: CT IMAGING | Age: 55
End: 2020-07-21
Attending: NURSE PRACTITIONER
Payer: COMMERCIAL

## 2020-07-21 ENCOUNTER — HOSPITAL ENCOUNTER (EMERGENCY)
Age: 55
Discharge: HOME | End: 2020-07-21
Attending: EMERGENCY MEDICINE
Payer: COMMERCIAL

## 2020-07-21 ENCOUNTER — APPOINTMENT (OUTPATIENT)
Dept: GENERAL RADIOLOGY | Age: 55
End: 2020-07-21
Attending: NURSE PRACTITIONER
Payer: COMMERCIAL

## 2020-07-21 VITALS
HEIGHT: 67 IN | WEIGHT: 203 LBS | RESPIRATION RATE: 20 BRPM | HEART RATE: 86 BPM | BODY MASS INDEX: 31.86 KG/M2 | DIASTOLIC BLOOD PRESSURE: 77 MMHG | OXYGEN SATURATION: 97 % | SYSTOLIC BLOOD PRESSURE: 117 MMHG | TEMPERATURE: 99 F

## 2020-07-21 DIAGNOSIS — J18.9 PNEUMONIA OF LEFT LOWER LOBE DUE TO INFECTIOUS ORGANISM: Primary | ICD-10-CM

## 2020-07-21 PROBLEM — K57.90 DIVERTICULOSIS: Status: ACTIVE | Noted: 2020-07-21

## 2020-07-21 PROBLEM — K64.8 INTERNAL HEMORRHOIDS: Status: ACTIVE | Noted: 2020-07-21

## 2020-07-21 PROBLEM — K63.5 POLYP OF COLON: Status: ACTIVE | Noted: 2020-07-21

## 2020-07-21 PROBLEM — Z12.11 SPECIAL SCREENING FOR MALIGNANT NEOPLASM OF COLON: Status: ACTIVE | Noted: 2020-07-21

## 2020-07-21 LAB
ALBUMIN SERPL-MCNC: 3.4 G/DL (ref 3.4–5)
ALBUMIN/GLOB SERPL: 0.9 {RATIO} (ref 1–2)
ALP LIVER SERPL-CCNC: 91 U/L (ref 41–108)
ALT SERPL-CCNC: 16 U/L (ref 13–56)
ANION GAP SERPL CALC-SCNC: 5 MMOL/L (ref 0–18)
AST SERPL-CCNC: 13 U/L (ref 15–37)
BASOPHILS # BLD AUTO: 0.02 X10(3) UL (ref 0–0.2)
BASOPHILS NFR BLD AUTO: 0.1 %
BILIRUB SERPL-MCNC: 0.6 MG/DL (ref 0.1–2)
BILIRUB UR QL STRIP.AUTO: NEGATIVE
BUN BLD-MCNC: 10 MG/DL (ref 7–18)
BUN/CREAT SERPL: 13.5 (ref 10–20)
CALCIUM BLD-MCNC: 8.6 MG/DL (ref 8.5–10.1)
CHLORIDE SERPL-SCNC: 109 MMOL/L (ref 98–112)
CO2 SERPL-SCNC: 26 MMOL/L (ref 21–32)
COLOR UR AUTO: YELLOW
CREAT BLD-MCNC: 0.74 MG/DL (ref 0.55–1.02)
DEPRECATED RDW RBC AUTO: 42.8 FL (ref 35.1–46.3)
EOSINOPHIL # BLD AUTO: 0.02 X10(3) UL (ref 0–0.7)
EOSINOPHIL NFR BLD AUTO: 0.1 %
ERYTHROCYTE [DISTWIDTH] IN BLOOD BY AUTOMATED COUNT: 12.7 % (ref 11–15)
GLOBULIN PLAS-MCNC: 4 G/DL (ref 2.8–4.4)
GLUCOSE BLD-MCNC: 126 MG/DL (ref 70–99)
GLUCOSE UR STRIP.AUTO-MCNC: NEGATIVE MG/DL
HCT VFR BLD AUTO: 37.8 % (ref 35–48)
HGB BLD-MCNC: 12.3 G/DL (ref 12–16)
IMM GRANULOCYTES # BLD AUTO: 0.03 X10(3) UL (ref 0–1)
IMM GRANULOCYTES NFR BLD: 0.2 %
LEUKOCYTE ESTERASE UR QL STRIP.AUTO: NEGATIVE
LYMPHOCYTES # BLD AUTO: 0.87 X10(3) UL (ref 1–4)
LYMPHOCYTES NFR BLD AUTO: 6.2 %
M PROTEIN MFR SERPL ELPH: 7.4 G/DL (ref 6.4–8.2)
MCH RBC QN AUTO: 30.1 PG (ref 26–34)
MCHC RBC AUTO-ENTMCNC: 32.5 G/DL (ref 31–37)
MCV RBC AUTO: 92.4 FL (ref 80–100)
MONOCYTES # BLD AUTO: 0.48 X10(3) UL (ref 0.1–1)
MONOCYTES NFR BLD AUTO: 3.4 %
NEUTROPHILS # BLD AUTO: 12.52 X10 (3) UL (ref 1.5–7.7)
NEUTROPHILS # BLD AUTO: 12.52 X10(3) UL (ref 1.5–7.7)
NEUTROPHILS NFR BLD AUTO: 90 %
NITRITE UR QL STRIP.AUTO: NEGATIVE
OSMOLALITY SERPL CALC.SUM OF ELEC: 291 MOSM/KG (ref 275–295)
PH UR STRIP.AUTO: 7 [PH] (ref 4.5–8)
PLATELET # BLD AUTO: 252 10(3)UL (ref 150–450)
POTASSIUM SERPL-SCNC: 3.8 MMOL/L (ref 3.5–5.1)
PROT UR STRIP.AUTO-MCNC: NEGATIVE MG/DL
RBC # BLD AUTO: 4.09 X10(6)UL (ref 3.8–5.3)
RBC UR QL AUTO: NEGATIVE
SARS-COV-2 RNA RESP QL NAA+PROBE: NOT DETECTED
SODIUM SERPL-SCNC: 140 MMOL/L (ref 136–145)
SP GR UR STRIP.AUTO: 1.02 (ref 1–1.03)
UROBILINOGEN UR STRIP.AUTO-MCNC: 0.2 MG/DL
WBC # BLD AUTO: 13.9 X10(3) UL (ref 4–11)

## 2020-07-21 PROCEDURE — 81003 URINALYSIS AUTO W/O SCOPE: CPT | Performed by: NURSE PRACTITIONER

## 2020-07-21 PROCEDURE — 80053 COMPREHEN METABOLIC PANEL: CPT | Performed by: NURSE PRACTITIONER

## 2020-07-21 PROCEDURE — 36415 COLL VENOUS BLD VENIPUNCTURE: CPT

## 2020-07-21 PROCEDURE — 71045 X-RAY EXAM CHEST 1 VIEW: CPT | Performed by: NURSE PRACTITIONER

## 2020-07-21 PROCEDURE — 99284 EMERGENCY DEPT VISIT MOD MDM: CPT

## 2020-07-21 PROCEDURE — 85025 COMPLETE CBC W/AUTO DIFF WBC: CPT | Performed by: NURSE PRACTITIONER

## 2020-07-21 PROCEDURE — 99453 REM MNTR PHYSIOL PARAM SETUP: CPT

## 2020-07-21 PROCEDURE — 74177 CT ABD & PELVIS W/CONTRAST: CPT | Performed by: NURSE PRACTITIONER

## 2020-07-21 RX ORDER — ALBUTEROL SULFATE 2.5 MG/3ML
2.5 SOLUTION RESPIRATORY (INHALATION) EVERY 4 HOURS PRN
Qty: 30 AMPULE | Refills: 0 | Status: SHIPPED | OUTPATIENT
Start: 2020-07-21 | End: 2020-08-20

## 2020-07-21 RX ORDER — AMOXICILLIN AND CLAVULANATE POTASSIUM 875; 125 MG/1; MG/1
1 TABLET, FILM COATED ORAL 2 TIMES DAILY
Qty: 20 TABLET | Refills: 0 | Status: SHIPPED | OUTPATIENT
Start: 2020-07-21 | End: 2020-07-31

## 2020-07-21 NOTE — ED INITIAL ASSESSMENT (HPI)
Patient arrives with complaints of fever and chills s/p colonoscopy this am. Patient states she went home and went to sleep and when she woke up she was having body aches, fever, and chills.  Tylenol taken at 1600

## 2020-07-21 NOTE — ED PROVIDER NOTES
Patient Seen in: [facility] [place]      History   Patient presents with:  Fever    Stated Complaint: had upper and lower GI today, slept until 1030 - spiked fever this afternoon, s*    HPI  46 yo female presents with fever, body a Alcohol/week: 0.0 standard drinks    Drug use: No             Review of Systems   All other systems reviewed and are negative.       Positive for stated complaint: had upper and lower GI today, slept until 1030 - spiked fever this afternoon, s*  Other syste Ratio 0.9 (*)     All other components within normal limits   URINALYSIS WITH CULTURE REFLEX - Abnormal; Notable for the following components:    Clarity Urine Slightly Cloudy (*)     Ketones Urine Trace (*)     All other components within normal limits atypical pneumonia. Continued clinical correlation and follow-up recommended.     Dictated by: Brian Mullen MD on 7/21/2020 at 6:41 PM     Finalized by: Brian Mullen MD on 7/21/2020 at 6:42 PM          Ct Abdomen Pelvis Iv Contrast, No Oral (er)    Result Da or calculus. PELVIC NODES:  No adenopathy. PELVIC ORGANS:  No visible mass. Pelvic organs appropriate for patient age. BONES:  No bony lesion or fracture.   LUNG BASES:  Mild patchy airspace disease with some ground-glass opacities within the left lower Tab  Take 1 tablet by mouth 2 (two) times daily for 10 days.   Qty: 20 tablet Refills: 0    albuterol sulfate (2.5 MG/3ML) 0.083% Inhalation Nebu Soln  Take 3 mL (2.5 mg total) by nebulization every 4 (four) hours as needed for Wheezing or Shortness of [facility]

## 2020-07-22 ENCOUNTER — TELEPHONE (OUTPATIENT)
Dept: INTERNAL MEDICINE CLINIC | Facility: CLINIC | Age: 55
End: 2020-07-22

## 2020-07-22 DIAGNOSIS — J18.9 PNEUMONIA DUE TO INFECTIOUS ORGANISM, UNSPECIFIED LATERALITY, UNSPECIFIED PART OF LUNG: Primary | ICD-10-CM

## 2020-07-22 NOTE — TELEPHONE ENCOUNTER
Patient had EGD/colon on 7/21. Had some coughing after her procedure. Developed fever 101.4 in evening at home. She called her GI and was told to go to ED. She has CXR and CT abdomen with finding of PNA. CBC w/leukocytosis. COVID-19 was negative.  Patient w

## 2020-07-22 NOTE — ED PROVIDER NOTES
I reviewed that chart and discussed the case. I have examined the patient and noted conjunctival within normal limits. No distress patient speaks in full sentences. Minimal right lower quadrant tenderness to palpation no abdominal distention.   No CVA te

## 2020-08-05 DIAGNOSIS — R73.03 PREDIABETES: ICD-10-CM

## 2020-08-05 RX ORDER — METFORMIN HYDROCHLORIDE 500 MG/1
TABLET, EXTENDED RELEASE ORAL
Qty: 90 TABLET | Refills: 0 | Status: SHIPPED | OUTPATIENT
Start: 2020-08-05 | End: 2020-10-28

## 2020-08-18 ENCOUNTER — OFFICE VISIT (OUTPATIENT)
Dept: INTERNAL MEDICINE CLINIC | Facility: CLINIC | Age: 55
End: 2020-08-18
Payer: COMMERCIAL

## 2020-08-18 VITALS
BODY MASS INDEX: 31.67 KG/M2 | TEMPERATURE: 98 F | HEART RATE: 70 BPM | WEIGHT: 201.81 LBS | DIASTOLIC BLOOD PRESSURE: 74 MMHG | SYSTOLIC BLOOD PRESSURE: 122 MMHG | OXYGEN SATURATION: 97 % | HEIGHT: 67 IN | RESPIRATION RATE: 14 BRPM

## 2020-08-18 DIAGNOSIS — E55.9 VITAMIN D DEFICIENCY: ICD-10-CM

## 2020-08-18 DIAGNOSIS — E89.0 POSTABLATIVE HYPOTHYROIDISM: ICD-10-CM

## 2020-08-18 DIAGNOSIS — E78.5 DYSLIPIDEMIA (HIGH LDL; LOW HDL): ICD-10-CM

## 2020-08-18 DIAGNOSIS — E66.9 OBESITY (BMI 30-39.9): ICD-10-CM

## 2020-08-18 DIAGNOSIS — Z99.89 OSA ON CPAP: ICD-10-CM

## 2020-08-18 DIAGNOSIS — G47.33 OSA ON CPAP: ICD-10-CM

## 2020-08-18 DIAGNOSIS — J45.20 MILD INTERMITTENT ASTHMA WITHOUT COMPLICATION: ICD-10-CM

## 2020-08-18 DIAGNOSIS — I10 ESSENTIAL HYPERTENSION: Primary | ICD-10-CM

## 2020-08-18 DIAGNOSIS — R93.89 ABNORMAL CHEST X-RAY: ICD-10-CM

## 2020-08-18 DIAGNOSIS — R73.03 PREDIABETES: ICD-10-CM

## 2020-08-18 PROBLEM — Z01.419 ENCOUNTER FOR ANNUAL ROUTINE GYNECOLOGICAL EXAMINATION: Status: RESOLVED | Noted: 2017-12-02 | Resolved: 2020-08-18

## 2020-08-18 PROBLEM — Z12.11 SPECIAL SCREENING FOR MALIGNANT NEOPLASM OF COLON: Status: RESOLVED | Noted: 2020-07-21 | Resolved: 2020-08-18

## 2020-08-18 PROCEDURE — 99214 OFFICE O/P EST MOD 30 MIN: CPT | Performed by: INTERNAL MEDICINE

## 2020-08-18 PROCEDURE — 3078F DIAST BP <80 MM HG: CPT | Performed by: INTERNAL MEDICINE

## 2020-08-18 PROCEDURE — 3008F BODY MASS INDEX DOCD: CPT | Performed by: INTERNAL MEDICINE

## 2020-08-18 PROCEDURE — 3074F SYST BP LT 130 MM HG: CPT | Performed by: INTERNAL MEDICINE

## 2020-08-18 NOTE — PROGRESS NOTES
[address] [place] [address] is following up with patient for baseline assessment of Enbrel, and to set up delivery. Background of drug therapy (include start date)  Patient has been receiving Enbrel as recently as 2/18 and will now be restarting on the medication. Background  Verify Allergies: ALLERGIES:  Dilantin; Erythromycin; Penicillins; and Phenobarbital    Verify Medication list:   Current Outpatient Medications   Medication Sig   â¢ etanercept (ENBREL SURECLICK) 50 MG/ML auto-injector injection Inject 1 autoinjector into the skin twice weekly x 4 weeks then decrease to once weekly   â¢ etanercept (ENBREL SURECLICK) 50 MG/ML auto-injector injection Inject 1 autoinjector into the skin 1 day a week. (following loading dose)   â¢ topiramate (TOPAMAX) 25 MG tablet TAKE TWO TABLETS BY MOUTH TWICE DAILY   â¢ sertraline (ZOLOFT) 100 MG tablet TAKE ONE TABLET BY MOUTH DAILY   â¢ lamoTRIgine (LAMICTAL XR) 250 MG extended-release tablet Take 2 tablets by mouth daily. â¢ folic acid (FOLATE) 1 MG tablet Take 2 tablets by mouth daily. â¢ ferrous sulfate 325 (65 FE) MG tablet Take 1 tablet by mouth 2 times daily. (Patient taking differently: Take 325 mg by mouth daily (with breakfast). )   â¢ cholecalciferol (VITAMIN D3) 1000 UNITS tablet Take 1,000 Units by mouth daily. Taking 2 pills daily   â¢ LORazepam (ATIVAN) 0.5 MG tablet Take 1 tablet by mouth every 6 hours as needed (Seizure, Max 2 mg/24 hours. Hold for sedation). â¢ BIOTIN FORTE PO Take 1 tablet by mouth daily. â¢ Omega-3 Fatty Acids (FISH OIL CONCENTRATE PO)    â¢ Multiple Vitamins-Minerals (MULTIVITAMIN ADULTS 50+ PO)      No current facility-administered medications for this visit. Drug Interactions Identified?     No    Drug-Food Interactions:   -      Screen Medications for ISMP high alert drugs   Medication profile does NOT include ISMP high alert drugs    Assessment    Purpose of treatment Established Patient Progress Note  Chief Complaint:   Patient presents with: Follow - Up: ED on 7/21/2020 - Discharge Pneumonia      HPI:   This is a 47year old female coming in for f/u chronic medical conditions and ED visit for PNA 1mo ago.     Prediabe "(indication): Psoriatic arthritis    What kind of side effects are you experiencing? None    How do you feel this medication is working for you? What did the doctor tell you about the effectiveness of this medication? Patient will restart soon    Literacy and Adherence Assessment  Patient's potential for non-adherence was assessed using the 4 question [place] tool. The patient's responses shown below indicate high probability of adherence. (One or more answers of ""Yes\"" is a potential sign of low adherence to medication). 1. Do you ever forget to take your medication? No    2. Do you ever have problems remembering to take your medication? No    3. When you feel better, do you sometimes stop taking your medication? No    4. Sometimes, if you feel worse when you take your medication, do you stop taking it? No    Patient's health literacy was assessed with a brief 3 question literacy tool. 1. How often do you have someone (like a family member, friend, hospital/clinic worker, or caregiver) help you read healthcare materials? None of the time    2. How often do you have problems learning about your medical condition because of difficulty understanding written information? None of the time    3. How confident are you filling out forms by yourself? All of the time    Based on patient's responses, does patient have potential health literacy issues? No    Educational Materials Provided: [address] [facility], Notice of Privacy Practices,      List relevant caregivers that can be contacted and/or medication information be discussed    Name Relationship Phone Number               Recommendations and pharmacist's care plan including follow up (including clinic contact instructions)  1. Recommend to restart Enbrel as prescribed. 2. Desires/movitation of the patient: reduce symptoms of disease  3.  Problems and/or needs identified upon today's assessment: None  a.   Strategies to " Antihistimines  Methimazole [Thiama*    HIVES  Singulair [[place]*    HIVES, RASH, ITCHING  Sudafed                 UNKNOWN    Comment:Heart palpitations.   Dust Mites              RASH, ITCHING    Comment:Runny /itchy nose, sneezing , nasal congestion; address problems and/or needs: NA  b. Measurable goals and timeframe for each: NA  4. No changes to the goals of care:  a. Ensure adherence  b. Minimize side effects  c. Maximize patientâs response to therapy  5. Patient was counseled on proper use of additional supplies provided, if any, as detailed above to help with common side effects and promote adherence. 6. Resources available to implement this care plan: verbal education by pharmacist, written education materials, pharmacist follow-up assessments. 7. Patientâs medical records [facility]) will be reviewed once every month or every cycle, whichever is less, to evaluate refill appropriateness and make interventions based on changes in their profile (ie. new medications, recent lab results etc). 8. An [employer] caregiver will follow up for refill delivery in 4 weeks, and determine treatment compliance and presence of adverse effects to treatment. 9. [facility] 30-day assessment due in 12/18 to evaluate patientâs response to therapy, side effects, changes in their medications/allergies, drug interactions and adherence. 10. Medication will be shipped 11/29/18 via Fedex and estimated delivery on 11/30/18 to patient's residence. 11. Patient has ASP phone number, [phone], for questions and concerns. 12. Patient acknowledges and agrees with plan of care.        Hola Merritt, PharmD  [facility]  [phone] sulfate (2.5 MG/3ML) 0.083% Inhalation Nebu Soln Take 3 mL (2.5 mg total) by nebulization every 4 (four) hours as needed for Wheezing or Shortness of Breath.  (Patient not taking: Reported on 8/18/2020 ) 30 ampule 0         REVIEW OF SYSTEMS:   See HPI  EXA levothyroxine, mgmt per endo. Vit D def on supplemental Vit D, mgmt per endo. Mild intermittent asthma on prn albuterol, stable. CPM.    Obesity, discussed need for lifestyle mods.     AbN CXR from recent pneumonia vs pneumonitis post EGD/Colon on July Constipation, unspecified constipation type

## 2020-09-08 ENCOUNTER — HOSPITAL ENCOUNTER (OUTPATIENT)
Dept: GENERAL RADIOLOGY | Age: 55
Discharge: HOME | End: 2020-09-08
Attending: INTERNAL MEDICINE
Payer: COMMERCIAL

## 2020-09-08 DIAGNOSIS — R93.89 ABNORMAL CHEST X-RAY: ICD-10-CM

## 2020-09-08 PROCEDURE — 71046 X-RAY EXAM CHEST 2 VIEWS: CPT | Performed by: INTERNAL MEDICINE

## 2020-09-09 ENCOUNTER — TELEPHONE (OUTPATIENT)
Dept: OBGYN CLINIC | Facility: CLINIC | Age: 55
End: 2020-09-09

## 2020-09-09 DIAGNOSIS — R92.8 ABNORMAL MAMMOGRAM: Primary | ICD-10-CM

## 2020-09-09 NOTE — TELEPHONE ENCOUNTER
----- Message from Shweta Juan sent at 9/9/2020 10:32 AM CDT -----  Regarding: Other  Contact: [phone]  Hello,    During the start of the Covid-19 outbreak I had received a letter from [facility] in regards to a mammogram  6 month follow-up.

## 2020-09-09 NOTE — TELEPHONE ENCOUNTER
PC with patient. She was due for a 6 month left follow up breast mammogram earlier this year. Due to covid she did not get done. Her yearly is going to be due after 9/25/2020. She would like to get both breast done. She scheduled her wwe for 9/30/2020.

## 2020-09-23 LAB
ALBUMIN/GLOBULIN RATIO: 1.4 (CALC) (ref 1–2.5)
ALBUMIN: 3.8 G/DL (ref 3.6–5.1)
ALKALINE PHOSPHATASE: 98 U/L (ref 37–153)
ALT: 12 U/L (ref 6–29)
AST: 11 U/L (ref 10–35)
BILIRUBIN, TOTAL: 0.4 MG/DL (ref 0.2–1.2)
BUN: 21 MG/DL (ref 7–25)
CALCIUM: 9.3 MG/DL (ref 8.6–10.4)
CARBON DIOXIDE: 30 MMOL/L (ref 20–32)
CHLORIDE: 106 MMOL/L (ref 98–110)
CHOL/HDLC RATIO: 3.6 (CALC)
CHOLESTEROL, TOTAL: 227 MG/DL
CREATININE, RANDOM URINE: 78 MG/DL (ref 20–275)
CREATININE: 0.67 MG/DL (ref 0.5–1.05)
EGFR IF AFRICN AM: 115 ML/MIN/1.73M2
EGFR IF NONAFRICN AM: 100 ML/MIN/1.73M2
GLOBULIN: 2.8 G/DL (CALC) (ref 1.9–3.7)
GLUCOSE: 99 MG/DL (ref 65–99)
HDL CHOLESTEROL: 63 MG/DL
HEMOGLOBIN A1C: 5.6 % OF TOTAL HGB
LDL-CHOLESTEROL: 140 MG/DL (CALC)
MICROALBUMIN/CREATININE RATIO, RANDOM URINE: 14 MCG/MG CREAT
MICROALBUMIN: 1.1 MG/DL
NON-HDL CHOLESTEROL: 164 MG/DL (CALC)
POTASSIUM: 4.4 MMOL/L (ref 3.5–5.3)
PROTEIN, TOTAL: 6.6 G/DL (ref 6.1–8.1)
SODIUM: 142 MMOL/L (ref 135–146)
TRIGLYCERIDES: 120 MG/DL

## 2020-09-30 ENCOUNTER — OFFICE VISIT (OUTPATIENT)
Dept: OBGYN CLINIC | Facility: CLINIC | Age: 55
End: 2020-09-30
Payer: COMMERCIAL

## 2020-09-30 VITALS
WEIGHT: 201 LBS | HEIGHT: 67 IN | SYSTOLIC BLOOD PRESSURE: 104 MMHG | BODY MASS INDEX: 31.55 KG/M2 | DIASTOLIC BLOOD PRESSURE: 66 MMHG | TEMPERATURE: 99 F | HEART RATE: 68 BPM

## 2020-09-30 DIAGNOSIS — Z11.51 SCREENING FOR HUMAN PAPILLOMAVIRUS: ICD-10-CM

## 2020-09-30 DIAGNOSIS — Z12.4 SCREENING FOR MALIGNANT NEOPLASM OF THE CERVIX: ICD-10-CM

## 2020-09-30 DIAGNOSIS — Z01.419 ENCOUNTER FOR ANNUAL ROUTINE GYNECOLOGICAL EXAMINATION: Primary | ICD-10-CM

## 2020-09-30 DIAGNOSIS — Z12.31 BREAST CANCER SCREENING BY MAMMOGRAM: ICD-10-CM

## 2020-09-30 DIAGNOSIS — R92.2 DENSE BREAST: ICD-10-CM

## 2020-09-30 PROCEDURE — 3074F SYST BP LT 130 MM HG: CPT | Performed by: OBSTETRICS & GYNECOLOGY

## 2020-09-30 PROCEDURE — 87624 HPV HI-RISK TYP POOLED RSLT: CPT | Performed by: OBSTETRICS & GYNECOLOGY

## 2020-09-30 PROCEDURE — 88175 CYTOPATH C/V AUTO FLUID REDO: CPT | Performed by: OBSTETRICS & GYNECOLOGY

## 2020-09-30 PROCEDURE — 3078F DIAST BP <80 MM HG: CPT | Performed by: OBSTETRICS & GYNECOLOGY

## 2020-09-30 PROCEDURE — 99396 PREV VISIT EST AGE 40-64: CPT | Performed by: OBSTETRICS & GYNECOLOGY

## 2020-09-30 PROCEDURE — 3008F BODY MASS INDEX DOCD: CPT | Performed by: OBSTETRICS & GYNECOLOGY

## 2020-09-30 NOTE — PROGRESS NOTES
Pt here for pe/pap.   LMP:Years ago more than 5   Last pap:9/25/19 negative  Last mammogram;10/16/19   Birth control:Post menopausal

## 2020-09-30 NOTE — PROGRESS NOTES
HPI:   Daya Ahn is a 47year old [place] who presents for an annual gynecological exam.   Menses: No LMP recorded (within years).  Patient is postmenopausal. Menopause: postmenopausal     Current Outpatient Medications   Medication Sig Dispense Refill disorders of thyroid     Graves Disease   • RAD (reactive airway disease)    • Rib contusion    • Sinusitis    • Wears glasses    • Weight gain       Past Surgical History:   Procedure Laterality Date   • COLONOSCOPY     • EGD     • UPPER GI ENDOSCOPY,EXAM rashes, no acne, no hirsutism, no ulcers  HEENT: Atraumatic, normocephalic, throat is clear  NECK: No masses, symmetric  THYROID: No masses, no thyromegaly  BREASTS: Normal inspection, no dominant masses on palpation, no lymphadenopathy  CV: Regular rate a cessation, if indicated. Pt voiced understanding of all instructions; all questions answered; no barriers to learning.       ASSESSMENT AND PLAN:   Pam Lion is a 47year old female who presents for an annual gynecological exam.        Normal exam.  P

## 2020-10-02 RX ORDER — ALBUTEROL SULFATE 90 UG/1
AEROSOL, METERED RESPIRATORY (INHALATION)
Qty: 1 INHALER | Refills: 0 | Status: SHIPPED | OUTPATIENT
Start: 2020-10-02 | End: 2020-12-29

## 2020-10-02 NOTE — TELEPHONE ENCOUNTER
Last OV relevant to medication: 8/18/20  Last refill date: 2/21/20     #/refills: 1/0  When pt was asked to return for OV: 3 mo  Upcoming appt/reason: 11/3/20 cpx with Dr Janell Nevarez

## 2020-10-02 NOTE — TELEPHONE ENCOUNTER
Did the patient contact the pharmacy directly?:  Pharmacy is making the order    Is patient out of meds or supply very low?:  out    Medication Requested:  Albuterol Sulfate HFA (PROAIR HFA) 108 (90 Base) MCG/ACT Inhalation Aero Soln  Dose:      Is patient

## 2020-10-25 ENCOUNTER — TELEPHONE (OUTPATIENT)
Dept: INTERNAL MEDICINE CLINIC | Facility: CLINIC | Age: 55
End: 2020-10-25

## 2020-10-26 NOTE — TELEPHONE ENCOUNTER
LMTCB received refill request for albuterol inhaler. Inhaler was refilled 102/2020. Wanted to see if pt requested refill or pharmacy.   Last OV relevant to medication: 8/18/20  Last refill date: 10/2/20    #1/refills:0  When pt was asked to return for OV:3

## 2020-10-27 RX ORDER — ALBUTEROL SULFATE 90 UG/1
AEROSOL, METERED RESPIRATORY (INHALATION)
Qty: 6.7 INHALER | Refills: 0 | OUTPATIENT
Start: 2020-10-27

## 2020-10-27 NOTE — TELEPHONE ENCOUNTER
Pt did not call back regarding albuterol inhaler. Refused refill with note to pharmacy to have pt call office.

## 2020-10-28 DIAGNOSIS — R73.03 PREDIABETES: ICD-10-CM

## 2020-10-28 RX ORDER — METFORMIN HYDROCHLORIDE 500 MG/1
500 TABLET, EXTENDED RELEASE ORAL
Qty: 90 TABLET | Refills: 0 | Status: SHIPPED | OUTPATIENT
Start: 2020-10-28 | End: 2021-02-04

## 2020-10-28 NOTE — TELEPHONE ENCOUNTER
Pt called to say that she is not having breathing problems and that somebody already approved her inhaler and she has it     Thank you

## 2020-10-29 DIAGNOSIS — J30.9 ALLERGIC RHINITIS, UNSPECIFIED SEASONALITY, UNSPECIFIED TRIGGER: ICD-10-CM

## 2020-10-29 RX ORDER — FLUTICASONE PROPIONATE 50 MCG
SPRAY, SUSPENSION (ML) NASAL
Qty: 1 BOTTLE | Refills: 0 | Status: SHIPPED | OUTPATIENT
Start: 2020-10-29 | End: 2021-02-12

## 2020-11-10 ENCOUNTER — OFFICE VISIT (OUTPATIENT)
Dept: INTERNAL MEDICINE CLINIC | Facility: CLINIC | Age: 55
End: 2020-11-10
Payer: COMMERCIAL

## 2020-11-10 VITALS
DIASTOLIC BLOOD PRESSURE: 78 MMHG | HEART RATE: 72 BPM | HEIGHT: 67 IN | OXYGEN SATURATION: 98 % | BODY MASS INDEX: 32.05 KG/M2 | SYSTOLIC BLOOD PRESSURE: 128 MMHG | WEIGHT: 204.19 LBS | RESPIRATION RATE: 16 BRPM | TEMPERATURE: 99 F

## 2020-11-10 DIAGNOSIS — J45.20 MILD INTERMITTENT ASTHMA WITHOUT COMPLICATION: ICD-10-CM

## 2020-11-10 DIAGNOSIS — Z00.00 PHYSICAL EXAM, ANNUAL: Primary | ICD-10-CM

## 2020-11-10 PROCEDURE — 99396 PREV VISIT EST AGE 40-64: CPT | Performed by: INTERNAL MEDICINE

## 2020-11-10 PROCEDURE — 3074F SYST BP LT 130 MM HG: CPT | Performed by: INTERNAL MEDICINE

## 2020-11-10 PROCEDURE — 3008F BODY MASS INDEX DOCD: CPT | Performed by: INTERNAL MEDICINE

## 2020-11-10 PROCEDURE — 3078F DIAST BP <80 MM HG: CPT | Performed by: INTERNAL MEDICINE

## 2020-11-10 NOTE — PROGRESS NOTES
[facility]    CHIEF COMPLAINT: Patient presents with:  Routine Physical: sees gyne. 9/30/20-pap.9/25/19-mammo. 7/21/20-colon-repeat 3 years  Immunization/Injection: declines flu shot. last tetanus in 2001. Declines tetanus shot at this time.    As tablet 3   • LISINOPRIL 10 MG Oral Tab TAKE 1 TABLET DAILY 90 tablet 3   • NON FORMULARY Take 1 tablet by mouth daily. Unforgettable's for cognitive health     • Calcium 500-125 MG-UNIT Oral Tab Take by mouth.      • Vitamin D3 2000 units Oral Cap Take 2,00 0.0 standard drinks    Drug use: No    : single.     Exercise: minimal.  Diet: watches minimally     REVIEW OF SYSTEMS:   GENERAL: feels well otherwise  SKIN: denies any unusual skin lesions  EYES:denies blurred vision or double vision  HEENT: denies CREATSERUM 0.67 09/22/2020 08:26 AM    CA 9.3 09/22/2020 08:26 AM    ALB 3.8 09/22/2020 08:26 AM    TP 6.6 09/22/2020 08:26 AM    ALKPHO 98 09/22/2020 08:26 AM    AST 11 09/22/2020 08:26 AM    ALT 12 09/22/2020 08:26 AM    BILT 0.4 09/22/2020 08:26 AM

## 2020-11-17 ENCOUNTER — HOSPITAL ENCOUNTER (OUTPATIENT)
Dept: MAMMOGRAPHY | Age: 55
Discharge: HOME | End: 2020-11-17
Attending: OBSTETRICS & GYNECOLOGY
Payer: COMMERCIAL

## 2020-11-17 DIAGNOSIS — Z12.31 BREAST CANCER SCREENING BY MAMMOGRAM: ICD-10-CM

## 2020-11-17 DIAGNOSIS — Z12.31 ENCOUNTER FOR SCREENING MAMMOGRAM FOR MALIGNANT NEOPLASM OF BREAST: ICD-10-CM

## 2020-11-17 PROCEDURE — 77067 SCR MAMMO BI INCL CAD: CPT | Performed by: OBSTETRICS & GYNECOLOGY

## 2020-11-17 PROCEDURE — 77063 BREAST TOMOSYNTHESIS BI: CPT | Performed by: OBSTETRICS & GYNECOLOGY

## 2020-11-18 NOTE — PROGRESS NOTES
Vadim Glaser,   Your mammogram results are normal. I encourage you to learn more about each test by accessing the great resources available through [employer].  Click on the test name if you would like to see a description of the test. I hope this information is us

## 2020-12-29 DIAGNOSIS — J45.20 MILD INTERMITTENT ASTHMA WITHOUT COMPLICATION: Primary | ICD-10-CM

## 2020-12-29 DIAGNOSIS — R73.03 PREDIABETES: ICD-10-CM

## 2020-12-29 RX ORDER — ALBUTEROL SULFATE 90 UG/1
AEROSOL, METERED RESPIRATORY (INHALATION)
Qty: 1 INHALER | Refills: 0 | Status: SHIPPED | OUTPATIENT
Start: 2020-12-29 | End: 2021-01-23

## 2020-12-29 RX ORDER — METFORMIN HYDROCHLORIDE 500 MG/1
TABLET, EXTENDED RELEASE ORAL
Qty: 90 TABLET | Refills: 0 | OUTPATIENT
Start: 2020-12-29

## 2020-12-29 NOTE — TELEPHONE ENCOUNTER
Last OV relevant to medication: 11/10/2020, PE  Last refill date: 10/28/2020    #/refills: 90-0  When pt was asked to return for OV: 6 months  Upcoming appt/reason: None scheduled  Lab Results   Component Value Date     11/16/2017    A1C 5.6 09/22/2

## 2021-01-19 PROBLEM — E11.9 TYPE 2 DIABETES MELLITUS WITHOUT COMPLICATION, WITHOUT LONG-TERM CURRENT USE OF INSULIN (HCC): Status: ACTIVE | Noted: 2021-01-19

## 2021-01-19 PROBLEM — E78.2 MIXED HYPERLIPIDEMIA: Status: ACTIVE | Noted: 2021-01-19

## 2021-01-23 DIAGNOSIS — J45.20 MILD INTERMITTENT ASTHMA WITHOUT COMPLICATION: ICD-10-CM

## 2021-01-23 RX ORDER — ALBUTEROL SULFATE 90 UG/1
AEROSOL, METERED RESPIRATORY (INHALATION)
Qty: 1 INHALER | Refills: 0 | Status: SHIPPED | OUTPATIENT
Start: 2021-01-23 | End: 2022-01-04

## 2021-02-03 DIAGNOSIS — R73.03 PREDIABETES: ICD-10-CM

## 2021-02-03 NOTE — TELEPHONE ENCOUNTER
Last refill #90 on 10/28/2020  Last office visit pertaining to refill on 11/10/2020 -cpx  Patient asked to return on or around 5/10/2021 for a med recheck  Future Appointments   Date Time Provider Clyde Ramires   3/16/2021 11:45 AM Nicolasa Meyers,

## 2021-02-04 RX ORDER — METFORMIN HYDROCHLORIDE 500 MG/1
TABLET, EXTENDED RELEASE ORAL
Qty: 30 TABLET | Refills: 0 | Status: SHIPPED | OUTPATIENT
Start: 2021-02-04 | End: 2021-03-11

## 2021-02-12 DIAGNOSIS — J30.9 ALLERGIC RHINITIS, UNSPECIFIED SEASONALITY, UNSPECIFIED TRIGGER: ICD-10-CM

## 2021-02-12 RX ORDER — FLUTICASONE PROPIONATE 50 MCG
SPRAY, SUSPENSION (ML) NASAL
Qty: 1 BOTTLE | Refills: 0 | Status: SHIPPED | OUTPATIENT
Start: 2021-02-12 | End: 2021-03-11

## 2021-02-18 ENCOUNTER — TELEPHONE (OUTPATIENT)
Dept: INTERNAL MEDICINE CLINIC | Facility: CLINIC | Age: 56
End: 2021-02-18

## 2021-02-18 NOTE — TELEPHONE ENCOUNTER
Her last act was well controlled but looks like she has refilled albuterol in 12/2020 and in 1/2020. This indicates uncontrolled asthma. Please have her make an appt for asthma management.

## 2021-02-18 NOTE — TELEPHONE ENCOUNTER
Incoming (mail or fax): fax  Received from: [facility]/pharmacy  Documentation given to: Dr. Roberto Laboy"  Should pt be on a daily maintenance asthma inhaler? Please advise, thanks!

## 2021-02-23 NOTE — TELEPHONE ENCOUNTER
PSR - Please schedule pt for asthma follow-up. Thanks! We received fax from pharmacy asking if pt should be on a daily maintenance inhaler. Pt refilled rescue inhaler too soon in Dec & again in Jan, which indicates asthma uncontrolled.

## 2021-02-23 NOTE — TELEPHONE ENCOUNTER
Per pt, did not  an inhaler in Dec and Jan. She just picked up one and it is full. She does not need another inhaler and rarely uses hers. She will call in the next week or so to schedule her labs and a follow up appt in March with Dr Janell Nevarez.  Thank yo

## 2021-02-23 NOTE — TELEPHONE ENCOUNTER
Faxed form back. To call pt to schedule appt to discuss asthma. [facility]

## 2021-02-24 NOTE — TELEPHONE ENCOUNTER
Spoke to pharmacist.  Made aware we had received fax from them regarding potential gap in therapy & that we had faxed it back noting that we would discuss with pt. Updated that pt does not need daily maintenance inhaler at this time.   Pharmacist voiced un

## 2021-03-03 ENCOUNTER — TELEPHONE (OUTPATIENT)
Dept: INTERNAL MEDICINE CLINIC | Facility: CLINIC | Age: 56
End: 2021-03-03

## 2021-03-03 NOTE — TELEPHONE ENCOUNTER
Pt called and was trying set up an appointment w/ Dr. Agnieszka Lopes). However, pt was unsure what they needed to make the appt. For. PSR checked to see if there was anything and saw they were needing to schedule a Med check in may for a 6/month follow up.  Howev

## 2021-03-03 NOTE — TELEPHONE ENCOUNTER
Called and spoke with patient, confirmed with her per Dr Ashley Jaquez last OV note she need a medication follow up in May 2021.  Informed her of her lab orders at [facility]. Also informed her to fast for 10 hours before lab, morning medication and water is ok for the

## 2021-03-10 DIAGNOSIS — R73.03 PREDIABETES: ICD-10-CM

## 2021-03-10 DIAGNOSIS — J30.9 ALLERGIC RHINITIS, UNSPECIFIED SEASONALITY, UNSPECIFIED TRIGGER: ICD-10-CM

## 2021-03-11 RX ORDER — FLUTICASONE PROPIONATE 50 MCG
SPRAY, SUSPENSION (ML) NASAL
Qty: 1 BOTTLE | Refills: 0 | Status: SHIPPED | OUTPATIENT
Start: 2021-03-11 | End: 2021-04-16

## 2021-03-11 RX ORDER — METFORMIN HYDROCHLORIDE 500 MG/1
TABLET, EXTENDED RELEASE ORAL
Qty: 30 TABLET | Refills: 0 | Status: SHIPPED | OUTPATIENT
Start: 2021-03-11 | End: 2021-03-31

## 2021-03-25 ENCOUNTER — TELEPHONE (OUTPATIENT)
Dept: INTERNAL MEDICINE CLINIC | Facility: CLINIC | Age: 56
End: 2021-03-25

## 2021-03-25 DIAGNOSIS — E55.9 VITAMIN D DEFICIENCY: Primary | ICD-10-CM

## 2021-03-25 NOTE — TELEPHONE ENCOUNTER
Pt was wanting to confirm that her labs were not due until her med check appt in March. Does Dr Lea Arechiga want the A1C done before then. Pt got as call asking to get A1C done? Pt also was wondering if she could get Vit D checked also.  Please send Vit D to [facility]

## 2021-03-25 NOTE — TELEPHONE ENCOUNTER
Labs ordered at 11/10/2020 OV, likely not due until May visit since previous labs were 09/2020. To clarify for pt. Had received call to do labs. - are any due now? Looks like likely due before next visit in May, not yet scheduled.     Pt requesting Vitamin

## 2021-03-26 NOTE — TELEPHONE ENCOUNTER
Left detailed message on home number, ok per HIPAA. Advised of VIT D, A1C, CBC, CMP, FLP labs to do prior to next OV due in May. Advised to fast & call back to schedule May appt.

## 2021-03-31 DIAGNOSIS — R73.03 PREDIABETES: ICD-10-CM

## 2021-03-31 RX ORDER — METFORMIN HYDROCHLORIDE 500 MG/1
TABLET, EXTENDED RELEASE ORAL
Qty: 30 TABLET | Refills: 0 | Status: SHIPPED | OUTPATIENT
Start: 2021-03-31 | End: 2021-04-05

## 2021-03-31 NOTE — TELEPHONE ENCOUNTER
Last OV relevant to medication: 11/10/20  Last refill date: 3/11/21     #/refills: 30/0  When pt was asked to return for OV: 6 months  Upcoming appt/reason: none  Was pt informed of any over due labs: [employer] message was sent    Lab Results   Component Anali

## 2021-04-05 RX ORDER — METFORMIN HYDROCHLORIDE 500 MG/1
500 TABLET, EXTENDED RELEASE ORAL
Qty: 90 TABLET | Refills: 0 | Status: SHIPPED | OUTPATIENT
Start: 2021-04-05 | End: 2021-06-30

## 2021-04-05 NOTE — TELEPHONE ENCOUNTER
Received call from pharmacy, insurance refused payment on 30ds of metformin. They are requesting 90ds for coverage. Pended. Pt due for appt next month. Pt reviewed reminder to schedule and complete labs prior to this appt.

## 2021-04-15 DIAGNOSIS — J30.9 ALLERGIC RHINITIS, UNSPECIFIED SEASONALITY, UNSPECIFIED TRIGGER: ICD-10-CM

## 2021-04-16 RX ORDER — FLUTICASONE PROPIONATE 50 MCG
SPRAY, SUSPENSION (ML) NASAL
Qty: 1 BOTTLE | Refills: 2 | Status: SHIPPED | OUTPATIENT
Start: 2021-04-16 | End: 2021-07-30

## 2021-04-27 NOTE — PROGRESS NOTES
Spoke to pt. Made aware of results & recommendations. Pt voiced understanding.   Agreed to schedule appt; call transferred to front

## 2021-05-05 ENCOUNTER — OFFICE VISIT (OUTPATIENT)
Dept: INTERNAL MEDICINE CLINIC | Facility: CLINIC | Age: 56
End: 2021-05-05
Payer: COMMERCIAL

## 2021-05-05 VITALS
DIASTOLIC BLOOD PRESSURE: 68 MMHG | HEIGHT: 67 IN | WEIGHT: 206.88 LBS | TEMPERATURE: 97 F | SYSTOLIC BLOOD PRESSURE: 122 MMHG | OXYGEN SATURATION: 98 % | RESPIRATION RATE: 14 BRPM | HEART RATE: 72 BPM | BODY MASS INDEX: 32.47 KG/M2

## 2021-05-05 DIAGNOSIS — Z20.822 ENCOUNTER FOR PREPROCEDURE SCREENING LABORATORY TESTING FOR COVID-19: ICD-10-CM

## 2021-05-05 DIAGNOSIS — Z01.812 ENCOUNTER FOR PREPROCEDURE SCREENING LABORATORY TESTING FOR COVID-19: ICD-10-CM

## 2021-05-05 DIAGNOSIS — E78.5 HYPERLIPIDEMIA, UNSPECIFIED HYPERLIPIDEMIA TYPE: ICD-10-CM

## 2021-05-05 DIAGNOSIS — R73.03 PREDIABETES: Primary | ICD-10-CM

## 2021-05-05 DIAGNOSIS — R06.00 DOE (DYSPNEA ON EXERTION): ICD-10-CM

## 2021-05-05 DIAGNOSIS — J45.20 MILD INTERMITTENT ASTHMA WITHOUT COMPLICATION: ICD-10-CM

## 2021-05-05 PROCEDURE — 3078F DIAST BP <80 MM HG: CPT | Performed by: INTERNAL MEDICINE

## 2021-05-05 PROCEDURE — 3074F SYST BP LT 130 MM HG: CPT | Performed by: INTERNAL MEDICINE

## 2021-05-05 PROCEDURE — 3008F BODY MASS INDEX DOCD: CPT | Performed by: INTERNAL MEDICINE

## 2021-05-05 PROCEDURE — 93000 ELECTROCARDIOGRAM COMPLETE: CPT | Performed by: INTERNAL MEDICINE

## 2021-05-05 PROCEDURE — 99214 OFFICE O/P EST MOD 30 MIN: CPT | Performed by: INTERNAL MEDICINE

## 2021-05-05 NOTE — PROGRESS NOTES
[facility]    CHIEF COMPLAINT:  Patient presents with:  Diabetes  Medication Follow-Up  Lab Results        HISTORY OF PRESENT ILLNESS:  Here for med check. Htn: Taking meds regularly. No chest pain. Pre diabetes: working on low carb diet.  Skyler Interiano Smokeless tobacco: Never Used       PHYSICAL EXAM:  /68 (BP Location: Left arm, Patient Position: Sitting, Cuff Size: large)   Pulse 72   Temp 97 °F (36.1 °C) (Temporal)   Resp 14   Ht 5' 7\" (1.702 m)   Wt 206 lb 14.4 oz (93.8 kg)   SpO2 98%   BMI 04/23/2021    K 4.4 04/23/2021     04/23/2021    CO2 29 04/23/2021     Lab Results   Component Value Date     11/16/2017    A1C 5.5 04/23/2021     Lab Results   Component Value Date    CHOLEST 247 (H) 04/23/2021    TRIG 145 04/23/2021    HDL 6

## 2021-06-30 DIAGNOSIS — R73.03 PREDIABETES: ICD-10-CM

## 2021-06-30 RX ORDER — METFORMIN HYDROCHLORIDE 500 MG/1
TABLET, EXTENDED RELEASE ORAL
Qty: 90 TABLET | Refills: 1 | Status: SHIPPED | OUTPATIENT
Start: 2021-06-30 | End: 2021-10-26

## 2021-07-30 ENCOUNTER — LAB ENCOUNTER (OUTPATIENT)
Dept: LAB | Age: 56
End: 2021-07-30
Attending: INTERNAL MEDICINE
Payer: COMMERCIAL

## 2021-07-30 DIAGNOSIS — J30.9 ALLERGIC RHINITIS, UNSPECIFIED SEASONALITY, UNSPECIFIED TRIGGER: ICD-10-CM

## 2021-07-30 DIAGNOSIS — Z20.822 ENCOUNTER FOR PREPROCEDURE SCREENING LABORATORY TESTING FOR COVID-19: ICD-10-CM

## 2021-07-30 DIAGNOSIS — Z01.812 ENCOUNTER FOR PREPROCEDURE SCREENING LABORATORY TESTING FOR COVID-19: ICD-10-CM

## 2021-07-30 RX ORDER — FLUTICASONE PROPIONATE 50 MCG
SPRAY, SUSPENSION (ML) NASAL
Qty: 16 G | Refills: 1 | Status: SHIPPED | OUTPATIENT
Start: 2021-07-30 | End: 2021-08-25

## 2021-07-31 LAB — SARS-COV-2 RNA RESP QL NAA+PROBE: NOT DETECTED

## 2021-08-02 ENCOUNTER — HOSPITAL ENCOUNTER (OUTPATIENT)
Dept: CV DIAGNOSTICS | Age: 56
Discharge: HOME | End: 2021-08-02
Attending: INTERNAL MEDICINE
Payer: COMMERCIAL

## 2021-08-02 DIAGNOSIS — R06.00 DOE (DYSPNEA ON EXERTION): ICD-10-CM

## 2021-08-02 PROCEDURE — 93018 CV STRESS TEST I&R ONLY: CPT | Performed by: INTERNAL MEDICINE

## 2021-08-02 PROCEDURE — 93017 CV STRESS TEST TRACING ONLY: CPT | Performed by: INTERNAL MEDICINE

## 2021-08-02 PROCEDURE — 93350 STRESS TTE ONLY: CPT | Performed by: INTERNAL MEDICINE

## 2021-08-24 ENCOUNTER — HOSPITAL ENCOUNTER (OUTPATIENT)
Dept: CT IMAGING | Age: 56
Discharge: HOME | End: 2021-08-24
Attending: INTERNAL MEDICINE

## 2021-08-24 DIAGNOSIS — Z13.6 SCREENING FOR CARDIOVASCULAR CONDITION: ICD-10-CM

## 2021-08-25 DIAGNOSIS — J30.9 ALLERGIC RHINITIS, UNSPECIFIED SEASONALITY, UNSPECIFIED TRIGGER: ICD-10-CM

## 2021-08-25 RX ORDER — FLUTICASONE PROPIONATE 50 MCG
SPRAY, SUSPENSION (ML) NASAL
Qty: 16 ML | Refills: 1 | Status: SHIPPED | OUTPATIENT
Start: 2021-08-25 | End: 2021-09-22

## 2021-09-01 NOTE — PROGRESS NOTES
Spoke to pt, aware of results & recommendations. Pt voiced understanding. Pt states will call back to schedule due visit in November.

## 2021-09-22 DIAGNOSIS — J30.9 ALLERGIC RHINITIS, UNSPECIFIED SEASONALITY, UNSPECIFIED TRIGGER: ICD-10-CM

## 2021-09-22 RX ORDER — FLUTICASONE PROPIONATE 50 MCG
SPRAY, SUSPENSION (ML) NASAL
Qty: 16 ML | Refills: 1 | Status: SHIPPED | OUTPATIENT
Start: 2021-09-22 | End: 2021-10-19

## 2021-09-28 DIAGNOSIS — I10 ESSENTIAL HYPERTENSION: ICD-10-CM

## 2021-09-28 RX ORDER — LISINOPRIL 10 MG/1
10 TABLET ORAL DAILY
Qty: 90 TABLET | Refills: 0 | Status: SHIPPED | OUTPATIENT
Start: 2021-09-28 | End: 2021-10-25

## 2021-09-28 NOTE — TELEPHONE ENCOUNTER
Per pt, her rx for lisinopril was denied, she would like to know how to taper off of it. Please advise.  Thank you

## 2021-10-19 ENCOUNTER — TELEPHONE (OUTPATIENT)
Dept: INTERNAL MEDICINE CLINIC | Facility: CLINIC | Age: 56
End: 2021-10-19

## 2021-10-19 DIAGNOSIS — J30.9 ALLERGIC RHINITIS, UNSPECIFIED SEASONALITY, UNSPECIFIED TRIGGER: ICD-10-CM

## 2021-10-19 RX ORDER — FLUTICASONE PROPIONATE 50 MCG
SPRAY, SUSPENSION (ML) NASAL
Qty: 16 G | Refills: 0 | Status: SHIPPED | OUTPATIENT
Start: 2021-10-19 | End: 2021-11-19

## 2021-10-25 DIAGNOSIS — I10 ESSENTIAL HYPERTENSION: ICD-10-CM

## 2021-10-25 DIAGNOSIS — R73.03 PREDIABETES: ICD-10-CM

## 2021-10-25 RX ORDER — LISINOPRIL 10 MG/1
TABLET ORAL
Qty: 90 TABLET | Refills: 0 | Status: SHIPPED | OUTPATIENT
Start: 2021-10-25 | End: 2022-01-26

## 2021-10-26 ENCOUNTER — TELEPHONE (OUTPATIENT)
Dept: INTERNAL MEDICINE CLINIC | Facility: CLINIC | Age: 56
End: 2021-10-26

## 2021-10-26 ENCOUNTER — TELEPHONE (OUTPATIENT)
Dept: OBGYN CLINIC | Facility: CLINIC | Age: 56
End: 2021-10-26

## 2021-10-26 DIAGNOSIS — Z12.31 BREAST CANCER SCREENING BY MAMMOGRAM: Primary | ICD-10-CM

## 2021-10-26 RX ORDER — METFORMIN HYDROCHLORIDE 500 MG/1
TABLET, EXTENDED RELEASE ORAL
Qty: 30 TABLET | Refills: 0 | Status: SHIPPED | OUTPATIENT
Start: 2021-10-26 | End: 2021-10-27

## 2021-10-26 NOTE — TELEPHONE ENCOUNTER
Last OV relevant to medication: 5/5/21  Last refill date: 6/30/21     #/refills: 90/1  When pt was asked to return for OV: 6 months  Upcoming appt/reason: none  Was pt informed of any over due labs: yes    Patient given one time 30 day supply.   Patient maggy

## 2021-10-26 NOTE — TELEPHONE ENCOUNTER
Pt calling to have her lab orders faxed to [address] [place] [address] in [place] off [address].

## 2021-10-27 DIAGNOSIS — R73.03 PREDIABETES: ICD-10-CM

## 2021-10-27 RX ORDER — METFORMIN HYDROCHLORIDE 500 MG/1
500 TABLET, EXTENDED RELEASE ORAL
Qty: 90 TABLET | Refills: 0 | Status: SHIPPED | OUTPATIENT
Start: 2021-10-27

## 2021-10-27 NOTE — TELEPHONE ENCOUNTER
Noted rx sent yesterday for 30 day supply per \"one off\" protocol. Received fax requesting 90 day supply d/t insurance requirement. Okay for 90 day?   Pt scheduled PE 11/30/21

## 2021-11-01 ENCOUNTER — PATIENT MESSAGE (OUTPATIENT)
Dept: INTERNAL MEDICINE CLINIC | Facility: CLINIC | Age: 56
End: 2021-11-01

## 2021-11-01 DIAGNOSIS — E05.00 GRAVES DISEASE: Primary | ICD-10-CM

## 2021-11-01 DIAGNOSIS — Z01.00 ENCOUNTER FOR COMPLETE EYE EXAM: ICD-10-CM

## 2021-11-01 NOTE — TELEPHONE ENCOUNTER
Okay to try dr. Sierra Klein. We cannot guarantee network coverage so that should always be double checked by pt.

## 2021-11-01 NOTE — TELEPHONE ENCOUNTER
See pt's MyChart message below. Noted old ophthalmology referral from 2016 to Dr. Tatyana Mcwilliams. Please advise, thanks!

## 2021-11-01 NOTE — TELEPHONE ENCOUNTER
From: Daya Ahn  To: Deanne Lopez MD  Sent: 11/1/2021 12:45 PM CDT  Subject: Ophthalmologist     Hello. Can you refer me to an ophthalmologist that is in my [place]?  I need one to just check my eyes and Graves Disease that I was diagno

## 2021-11-16 PROCEDURE — 3044F HG A1C LEVEL LT 7.0%: CPT | Performed by: INTERNAL MEDICINE

## 2021-11-17 DIAGNOSIS — J30.9 ALLERGIC RHINITIS, UNSPECIFIED SEASONALITY, UNSPECIFIED TRIGGER: ICD-10-CM

## 2021-11-19 RX ORDER — FLUTICASONE PROPIONATE 50 MCG
SPRAY, SUSPENSION (ML) NASAL
Qty: 16 G | Refills: 0 | Status: SHIPPED | OUTPATIENT
Start: 2021-11-19 | End: 2021-12-08

## 2021-11-22 ENCOUNTER — OFFICE VISIT (OUTPATIENT)
Dept: OBGYN CLINIC | Facility: CLINIC | Age: 56
End: 2021-11-22
Payer: COMMERCIAL

## 2021-11-22 VITALS
SYSTOLIC BLOOD PRESSURE: 118 MMHG | DIASTOLIC BLOOD PRESSURE: 79 MMHG | HEIGHT: 67 IN | BODY MASS INDEX: 31.39 KG/M2 | TEMPERATURE: 99 F | HEART RATE: 70 BPM | WEIGHT: 200 LBS

## 2021-11-22 DIAGNOSIS — Z11.51 SCREENING FOR HUMAN PAPILLOMAVIRUS: ICD-10-CM

## 2021-11-22 DIAGNOSIS — Z12.4 SCREENING FOR MALIGNANT NEOPLASM OF CERVIX: ICD-10-CM

## 2021-11-22 DIAGNOSIS — Z01.419 ENCOUNTER FOR ANNUAL ROUTINE GYNECOLOGICAL EXAMINATION: Primary | ICD-10-CM

## 2021-11-22 PROCEDURE — 3078F DIAST BP <80 MM HG: CPT | Performed by: OBSTETRICS & GYNECOLOGY

## 2021-11-22 PROCEDURE — 87624 HPV HI-RISK TYP POOLED RSLT: CPT | Performed by: OBSTETRICS & GYNECOLOGY

## 2021-11-22 PROCEDURE — 88175 CYTOPATH C/V AUTO FLUID REDO: CPT | Performed by: OBSTETRICS & GYNECOLOGY

## 2021-11-22 PROCEDURE — 99396 PREV VISIT EST AGE 40-64: CPT | Performed by: OBSTETRICS & GYNECOLOGY

## 2021-11-22 PROCEDURE — 3074F SYST BP LT 130 MM HG: CPT | Performed by: OBSTETRICS & GYNECOLOGY

## 2021-11-22 PROCEDURE — 3008F BODY MASS INDEX DOCD: CPT | Performed by: OBSTETRICS & GYNECOLOGY

## 2021-11-22 NOTE — PROGRESS NOTES
HPI:   Buck Elena is a 64year old [place] who presents for an annual gynecological exam.   Menses: No LMP recorded (lmp unknown).  Patient is postmenopausal. Menopause: postmenopausal     Current Outpatient Medications   Medication Sig Dispense Refill HST    AHI 27    • Other specified disorders of thyroid     Graves Disease   • RAD (reactive airway disease)    • Rib contusion    • Sinusitis    • Wears glasses    • Weight gain       Past Surgical History:   Procedure Laterality Date   • COLONOSCOPY no rashes, no acne, no hirsutism, no ulcers  HEENT: Atraumatic, normocephalic, throat is clear  NECK: No masses, symmetric  THYROID: No masses, no thyromegaly  BREASTS: Normal inspection, no dominant masses on palpation, no lymphadenopathy  ABD: Soft, nont no barriers to learning. ASSESSMENT AND PLAN:   Kerrie Blanco is a 64year old female who presents for an annual gynecological exam.        Normal exam.  Pap done. Hx of abnormal paps  Mammogram ordered.   Screening colonoscopy current    Cholesterol an

## 2021-11-22 NOTE — PROGRESS NOTES
Pt here for pe/pap. LMP:7-8 years ago   Last pap:9/30/20 negative   Last mammogram;11/17/20 negative. 2021 mammogram has been ordered.   Birth control:N/A

## 2021-11-23 ENCOUNTER — HOSPITAL ENCOUNTER (OUTPATIENT)
Dept: MAMMOGRAPHY | Age: 56
Discharge: HOME | End: 2021-11-23
Attending: OBSTETRICS & GYNECOLOGY
Payer: COMMERCIAL

## 2021-11-23 DIAGNOSIS — Z12.31 BREAST CANCER SCREENING BY MAMMOGRAM: ICD-10-CM

## 2021-11-23 PROCEDURE — 77063 BREAST TOMOSYNTHESIS BI: CPT | Performed by: OBSTETRICS & GYNECOLOGY

## 2021-11-23 PROCEDURE — 77067 SCR MAMMO BI INCL CAD: CPT | Performed by: OBSTETRICS & GYNECOLOGY

## 2021-11-30 ENCOUNTER — TELEPHONE (OUTPATIENT)
Dept: OBGYN CLINIC | Facility: CLINIC | Age: 56
End: 2021-11-30

## 2021-11-30 ENCOUNTER — OFFICE VISIT (OUTPATIENT)
Dept: INTERNAL MEDICINE CLINIC | Facility: CLINIC | Age: 56
End: 2021-11-30
Payer: COMMERCIAL

## 2021-11-30 VITALS
HEART RATE: 76 BPM | DIASTOLIC BLOOD PRESSURE: 70 MMHG | HEIGHT: 67 IN | RESPIRATION RATE: 12 BRPM | BODY MASS INDEX: 31.89 KG/M2 | SYSTOLIC BLOOD PRESSURE: 122 MMHG | WEIGHT: 203.19 LBS | TEMPERATURE: 98 F

## 2021-11-30 DIAGNOSIS — I10 ESSENTIAL HYPERTENSION: ICD-10-CM

## 2021-11-30 DIAGNOSIS — E78.5 HYPERLIPIDEMIA, UNSPECIFIED HYPERLIPIDEMIA TYPE: ICD-10-CM

## 2021-11-30 DIAGNOSIS — Z00.00 PHYSICAL EXAM, ANNUAL: Primary | ICD-10-CM

## 2021-11-30 PROCEDURE — 3074F SYST BP LT 130 MM HG: CPT | Performed by: INTERNAL MEDICINE

## 2021-11-30 PROCEDURE — 99396 PREV VISIT EST AGE 40-64: CPT | Performed by: INTERNAL MEDICINE

## 2021-11-30 PROCEDURE — 3078F DIAST BP <80 MM HG: CPT | Performed by: INTERNAL MEDICINE

## 2021-11-30 PROCEDURE — 3008F BODY MASS INDEX DOCD: CPT | Performed by: INTERNAL MEDICINE

## 2021-11-30 NOTE — PROGRESS NOTES
[facility]    CHIEF COMPLAINT: Patient presents with:  Routine Physical: sees gyne. 11/22/21-pap. 11/23/21-mammo  Immunization/Injection: declines flu shot.          HPI:   Jennifer Bettencourt is a 64year old female who presents for a complete physical Vitamin D3 2000 units Oral Cap Take 2,000 Units by mouth daily. • loratadine 10 MG Oral Tab Take 10 mg by mouth daily.         Past Medical History:   Diagnosis Date   • Allergic rhinitis    • Antral gastritis    • Asthma    • Cervical dysplasia    • Co GENERAL: feels well otherwise  SKIN: denies any unusual skin lesions  EYES:denies blurred vision or double vision  HEENT: denies nasal congestion, sinus pain or ST  LUNGS: denies shortness of breath with exertion  CARDIOVASCULAR: denies chest pain on exe ALKPHO 102 11/16/2021 08:25 AM    AST 12 11/16/2021 08:25 AM    ALT 12 11/16/2021 08:25 AM    BILT 0.5 11/16/2021 08:25 AM    TSH 1.96 10/08/2019 08:51 AM    T4F 1.2 10/08/2019 08:51 AM        Lab Results   Component Value Date/Time    CHOLEST 221 (H) 1

## 2021-12-05 DIAGNOSIS — J30.9 ALLERGIC RHINITIS, UNSPECIFIED SEASONALITY, UNSPECIFIED TRIGGER: ICD-10-CM

## 2021-12-08 RX ORDER — FLUTICASONE PROPIONATE 50 MCG
SPRAY, SUSPENSION (ML) NASAL
Qty: 48 G | Refills: 1 | Status: SHIPPED | OUTPATIENT
Start: 2021-12-08

## 2022-01-02 DIAGNOSIS — J45.20 MILD INTERMITTENT ASTHMA WITHOUT COMPLICATION: ICD-10-CM

## 2022-01-04 RX ORDER — ALBUTEROL SULFATE 90 UG/1
AEROSOL, METERED RESPIRATORY (INHALATION)
Qty: 6.7 EACH | Refills: 1 | Status: SHIPPED | OUTPATIENT
Start: 2022-01-04

## 2022-01-24 ENCOUNTER — HOSPITAL ENCOUNTER (OUTPATIENT)
Age: 57
Discharge: HOME | End: 2022-01-24
Attending: EMERGENCY MEDICINE
Payer: COMMERCIAL

## 2022-01-24 VITALS
HEART RATE: 84 BPM | BODY MASS INDEX: 33 KG/M2 | RESPIRATION RATE: 18 BRPM | SYSTOLIC BLOOD PRESSURE: 144 MMHG | OXYGEN SATURATION: 97 % | WEIGHT: 210 LBS | TEMPERATURE: 98 F | DIASTOLIC BLOOD PRESSURE: 77 MMHG

## 2022-01-24 DIAGNOSIS — L50.9 URTICARIA: Primary | ICD-10-CM

## 2022-01-24 DIAGNOSIS — I10 ESSENTIAL HYPERTENSION: ICD-10-CM

## 2022-01-24 PROCEDURE — 99213 OFFICE O/P EST LOW 20 MIN: CPT

## 2022-01-24 RX ORDER — METHYLPREDNISOLONE 4 MG/1
TABLET ORAL
Qty: 1 EACH | Refills: 0 | Status: SHIPPED | OUTPATIENT
Start: 2022-01-24

## 2022-01-24 NOTE — ED PROVIDER NOTES
Patient Seen in: [facility]      History   Patient presents with:  Hives    Stated Complaint: hives     Subjective:   HPI    Patient is a 51-year-old woman complaining of hives behind the ears.   She does have a questionable allergy to pea since quittin.0      Smokeless tobacco: Never Used    Vaping Use      Vaping Use: Never used    Alcohol use: Not Currently      Alcohol/week: 0.0 standard drinks    Drug use:  No             Review of Systems    Positive for stated complaint: hives   Ot morning and in the evening as needed. Continue Pepcid. Go to ER if any concerns or problems.                             Disposition and Plan     Clinical Impression:  Urticaria  (primary encounter diagnosis)     Disposition:  Discharge  1/24/2022  3:52 p

## 2022-01-24 NOTE — ED INITIAL ASSESSMENT (HPI)
Patient noticed hives behind ears this morning, state that she's had similar episodes in the past. C/o itchiness. Took pepcid earlier.

## 2022-01-26 RX ORDER — LISINOPRIL 10 MG/1
TABLET ORAL
Qty: 90 TABLET | Refills: 1 | Status: SHIPPED | OUTPATIENT
Start: 2022-01-26

## 2022-02-23 NOTE — ED NOTES
Can pt see this dr?   Patient instructions included:  1. Orientation to the device, purpose, and return demo given  2. Instructions reviewed to check with pulse ox device every 8 hours and record oxygen level  3.  Check both sitting and after exertion (fast walking, climbing sta

## 2022-03-07 RX ORDER — METFORMIN HYDROCHLORIDE 500 MG/1
TABLET, EXTENDED RELEASE ORAL
Qty: 90 TABLET | Refills: 0 | Status: SHIPPED | OUTPATIENT
Start: 2022-03-07

## 2022-04-14 NOTE — TELEPHONE ENCOUNTER
Was patient advised to contact pharmacy directly?:  New pharmacy    Is patient out of meds or supply very low?:  out    Medication and dose Requested:  FLUTICASONE PROPIONATE 50 MCG/ACT Nasal Suspension    Is patient requesting a 30 or 90 day supply?:  90 done

## 2022-04-28 ENCOUNTER — MED REC SCAN ONLY (OUTPATIENT)
Dept: INTERNAL MEDICINE CLINIC | Facility: CLINIC | Age: 57
End: 2022-04-28

## 2022-05-25 LAB
ALBUMIN/GLOBULIN RATIO: 1.6 (CALC) (ref 1–2.5)
ALBUMIN: 4.2 G/DL (ref 3.6–5.1)
ALKALINE PHOSPHATASE: 94 U/L (ref 37–153)
ALT: 13 U/L (ref 6–29)
AST: 15 U/L (ref 10–35)
BILIRUBIN, TOTAL: 0.6 MG/DL (ref 0.2–1.2)
BUN: 18 MG/DL (ref 7–25)
CALCIUM: 9.2 MG/DL (ref 8.6–10.4)
CARBON DIOXIDE: 28 MMOL/L (ref 20–32)
CHLORIDE: 104 MMOL/L (ref 98–110)
CHOL/HDLC RATIO: 3.8 (CALC)
CHOLESTEROL, TOTAL: 245 MG/DL
CREATININE: 0.71 MG/DL (ref 0.5–1.05)
EGFR IF AFRICN AM: 110 ML/MIN/1.73M2
EGFR IF NONAFRICN AM: 95 ML/MIN/1.73M2
GLOBULIN: 2.7 G/DL (CALC) (ref 1.9–3.7)
GLUCOSE: 107 MG/DL (ref 65–99)
HDL CHOLESTEROL: 64 MG/DL
LDL-CHOLESTEROL: 156 MG/DL (CALC)
NON-HDL CHOLESTEROL: 181 MG/DL (CALC)
POTASSIUM: 4.3 MMOL/L (ref 3.5–5.3)
PROTEIN, TOTAL: 6.9 G/DL (ref 6.1–8.1)
SODIUM: 139 MMOL/L (ref 135–146)
TRIGLYCERIDES: 128 MG/DL

## 2022-06-10 DIAGNOSIS — R73.03 PREDIABETES: ICD-10-CM

## 2022-06-10 RX ORDER — METFORMIN HYDROCHLORIDE 500 MG/1
TABLET, EXTENDED RELEASE ORAL
Qty: 90 TABLET | Refills: 0 | Status: SHIPPED | OUTPATIENT
Start: 2022-06-10

## 2022-06-10 NOTE — TELEPHONE ENCOUNTER
Last OV relevant to medication: 11/30/2021  Last refill date:03/07/2022     #/refills: 90-0  When pt was asked to return for OV: Return in about 6 months (around 5/30/2022) for med check  Upcoming appt/reason: 06/14/2022 6 months follow up   Was pt informed of any over due labs: n/a  Lab Results   Component Value Date     11/16/2017    A1C 5.6 11/16/2021     Lab Results   Component Value Date     (H) 05/24/2022     Lab Results   Component Value Date    MALBP 1.11 11/16/2017    CREUR 191.00 11/16/2017    MICROALBUMIN 1.1 09/22/2020    CREAUR 78 09/22/2020    MALBCREACALC 14 09/22/2020

## 2022-06-12 DIAGNOSIS — J30.9 ALLERGIC RHINITIS, UNSPECIFIED SEASONALITY, UNSPECIFIED TRIGGER: ICD-10-CM

## 2022-06-13 RX ORDER — FLUTICASONE PROPIONATE 50 MCG
SPRAY, SUSPENSION (ML) NASAL
Qty: 48 ML | Refills: 0 | Status: SHIPPED | OUTPATIENT
Start: 2022-06-13

## 2022-06-13 NOTE — TELEPHONE ENCOUNTER
Patient given one time 30 day supply. Patient needs to complete her apt tomorrow for further refills.

## 2022-06-14 ENCOUNTER — OFFICE VISIT (OUTPATIENT)
Dept: INTERNAL MEDICINE CLINIC | Facility: CLINIC | Age: 57
End: 2022-06-14
Payer: COMMERCIAL

## 2022-06-14 VITALS
DIASTOLIC BLOOD PRESSURE: 70 MMHG | BODY MASS INDEX: 34.13 KG/M2 | HEART RATE: 76 BPM | WEIGHT: 202.38 LBS | TEMPERATURE: 99 F | RESPIRATION RATE: 12 BRPM | SYSTOLIC BLOOD PRESSURE: 110 MMHG | HEIGHT: 64.75 IN

## 2022-06-14 DIAGNOSIS — E78.5 HYPERLIPIDEMIA, UNSPECIFIED HYPERLIPIDEMIA TYPE: ICD-10-CM

## 2022-06-14 DIAGNOSIS — R73.03 PREDIABETES: ICD-10-CM

## 2022-06-14 DIAGNOSIS — J45.20 MILD INTERMITTENT ASTHMA WITHOUT COMPLICATION: ICD-10-CM

## 2022-06-14 DIAGNOSIS — I10 ESSENTIAL HYPERTENSION: Primary | ICD-10-CM

## 2022-06-14 PROCEDURE — 3008F BODY MASS INDEX DOCD: CPT | Performed by: INTERNAL MEDICINE

## 2022-06-14 PROCEDURE — 99214 OFFICE O/P EST MOD 30 MIN: CPT | Performed by: INTERNAL MEDICINE

## 2022-06-14 PROCEDURE — 3078F DIAST BP <80 MM HG: CPT | Performed by: INTERNAL MEDICINE

## 2022-06-14 PROCEDURE — 3074F SYST BP LT 130 MM HG: CPT | Performed by: INTERNAL MEDICINE

## 2022-06-14 RX ORDER — ROSUVASTATIN CALCIUM 5 MG/1
5 TABLET, COATED ORAL NIGHTLY
Qty: 30 TABLET | Refills: 1 | Status: SHIPPED | OUTPATIENT
Start: 2022-06-14

## 2022-06-25 DIAGNOSIS — I10 ESSENTIAL HYPERTENSION: ICD-10-CM

## 2022-06-27 RX ORDER — LISINOPRIL 10 MG/1
TABLET ORAL
Qty: 90 TABLET | Refills: 1 | Status: SHIPPED | OUTPATIENT
Start: 2022-06-27

## 2022-07-06 DIAGNOSIS — E78.5 HYPERLIPIDEMIA, UNSPECIFIED HYPERLIPIDEMIA TYPE: ICD-10-CM

## 2022-07-06 RX ORDER — ROSUVASTATIN CALCIUM 5 MG/1
TABLET, COATED ORAL
Qty: 30 TABLET | Refills: 1 | OUTPATIENT
Start: 2022-07-06

## 2022-07-06 NOTE — TELEPHONE ENCOUNTER
Cholesterol Medication Protocol Passed 07/06/2022 12:35 PM   Protocol Details  ALT < 80    ALT resulted within past year    Lipid panel within past 12 months    Appointment within past 12 or next 3 months     No future appointments.   Refill too soon

## 2022-07-19 DIAGNOSIS — R73.03 PREDIABETES: ICD-10-CM

## 2022-07-20 RX ORDER — METFORMIN HYDROCHLORIDE 500 MG/1
TABLET, EXTENDED RELEASE ORAL
Qty: 90 TABLET | Refills: 0 | OUTPATIENT
Start: 2022-07-20

## 2022-07-20 NOTE — TELEPHONE ENCOUNTER
Last OV relevant to medication: 6/14/22   Last refill date: 6/10/22 90    #/refills:   When pt was asked to return for OV: 12/14/22   Upcoming appt/reason: No future appointments.     Was pt informed of any over due labs: n/a   Lab Results   Component Value Date     (H) 05/24/2022    BUN 18 05/24/2022    BUNCREA NOT APPLICABLE 18/60/8985    CREATSERUM 0.71 05/24/2022    ANIONGAP 5 07/21/2020     11/16/2017    GFRNAA 95 05/24/2022    GFRAA 110 05/24/2022    CA 9.2 05/24/2022    OSMOCALC 291 07/21/2020    ALKPHO 94 05/24/2022    AST 15 05/24/2022    ALT 13 05/24/2022    BILT 0.6 05/24/2022    TP 6.9 05/24/2022    ALB 4.2 05/24/2022    GLOBULIN 2.7 05/24/2022    AGRATIO 1.6 05/24/2022     05/24/2022    K 4.3 05/24/2022     05/24/2022    CO2 28 05/24/2022     Lab Results   Component Value Date     11/16/2017    A1C 5.6 11/16/2021

## 2022-07-22 DIAGNOSIS — E78.5 HYPERLIPIDEMIA, UNSPECIFIED HYPERLIPIDEMIA TYPE: ICD-10-CM

## 2022-07-25 RX ORDER — ROSUVASTATIN CALCIUM 5 MG/1
TABLET, COATED ORAL
Qty: 90 TABLET | Refills: 1 | Status: SHIPPED | OUTPATIENT
Start: 2022-07-25

## 2022-09-07 ENCOUNTER — PATIENT MESSAGE (OUTPATIENT)
Dept: INTERNAL MEDICINE CLINIC | Facility: CLINIC | Age: 57
End: 2022-09-07

## 2022-09-18 DIAGNOSIS — J30.9 ALLERGIC RHINITIS, UNSPECIFIED SEASONALITY, UNSPECIFIED TRIGGER: ICD-10-CM

## 2022-09-20 RX ORDER — FLUTICASONE PROPIONATE 50 MCG
SPRAY, SUSPENSION (ML) NASAL
Qty: 48 ML | Refills: 0 | Status: SHIPPED | OUTPATIENT
Start: 2022-09-20

## 2022-09-28 ENCOUNTER — TELEMEDICINE (OUTPATIENT)
Dept: INTERNAL MEDICINE CLINIC | Facility: CLINIC | Age: 57
End: 2022-09-28

## 2022-09-28 VITALS — BODY MASS INDEX: 34 KG/M2 | HEIGHT: 64.75 IN

## 2022-09-28 DIAGNOSIS — B00.1 HERPES SIMPLEX LABIALIS: Primary | ICD-10-CM

## 2022-09-28 PROCEDURE — 99213 OFFICE O/P EST LOW 20 MIN: CPT | Performed by: NURSE PRACTITIONER

## 2022-09-28 RX ORDER — VALACYCLOVIR HYDROCHLORIDE 1 G/1
2000 TABLET, FILM COATED ORAL EVERY 12 HOURS SCHEDULED
Qty: 4 TABLET | Refills: 0 | Status: SHIPPED | OUTPATIENT
Start: 2022-09-28

## 2022-10-06 ENCOUNTER — TELEMEDICINE (OUTPATIENT)
Dept: INTERNAL MEDICINE CLINIC | Facility: CLINIC | Age: 57
End: 2022-10-06
Payer: COMMERCIAL

## 2022-10-06 DIAGNOSIS — L50.9 URTICARIA: ICD-10-CM

## 2022-10-06 DIAGNOSIS — U07.1 COVID-19 VIRUS INFECTION: Primary | ICD-10-CM

## 2022-10-06 PROCEDURE — 99214 OFFICE O/P EST MOD 30 MIN: CPT | Performed by: NURSE PRACTITIONER

## 2022-10-06 RX ORDER — NIRMATRELVIR AND RITONAVIR 300-100 MG
KIT ORAL
Qty: 30 TABLET | Refills: 0 | Status: SHIPPED | OUTPATIENT
Start: 2022-10-06

## 2022-10-06 RX ORDER — PREDNISONE 20 MG/1
40 TABLET ORAL DAILY
Qty: 10 TABLET | Refills: 0 | Status: SHIPPED | OUTPATIENT
Start: 2022-10-06 | End: 2022-10-11

## 2022-10-11 NOTE — TELEPHONE ENCOUNTER
From: Lina Adame  To: Malena Weiss  Sent: 9/7/2022 11:12 AM CDT  Subject: lab reminder    Aubree Mata,    After careful review of your medical record it has come to our attention that you are Overdue for Labs. To receive future refills on your medications coming due the labs need to be completed first.    The Labs are Fasting so: No Food for 10-12 hrs, Drink Plenty of Water, Take Medications at least 1 hour prior to blood draw. Refrain from taking any Biotin or Biotin containing supplements at least 3-4 days prior to blood draw. We take each of our patient's health very seriously and the key to maintaining your health is to routinely follow-up as planned with providers and lab work.     Please call our office directly if you have any questions at [phone].    Tonie Rosenbaum RN

## 2022-10-19 DIAGNOSIS — R73.03 PREDIABETES: ICD-10-CM

## 2022-10-19 DIAGNOSIS — E55.9 VITAMIN D DEFICIENCY: ICD-10-CM

## 2022-10-19 DIAGNOSIS — E78.5 HYPERLIPIDEMIA, UNSPECIFIED HYPERLIPIDEMIA TYPE: Primary | ICD-10-CM

## 2022-10-19 DIAGNOSIS — I10 ESSENTIAL HYPERTENSION: ICD-10-CM

## 2022-10-19 LAB
ALBUMIN/GLOBULIN RATIO: 1.4 (CALC) (ref 1–2.5)
ALBUMIN: 4 G/DL (ref 3.6–5.1)
ALKALINE PHOSPHATASE: 76 U/L (ref 37–153)
ALT: 15 U/L (ref 6–29)
AST: 13 U/L (ref 10–35)
BILIRUBIN, DIRECT: 0.1 MG/DL
BILIRUBIN, INDIRECT: 0.5 MG/DL (CALC) (ref 0.2–1.2)
BILIRUBIN, TOTAL: 0.6 MG/DL (ref 0.2–1.2)
GLOBULIN: 2.8 G/DL (CALC) (ref 1.9–3.7)
PROTEIN, TOTAL: 6.8 G/DL (ref 6.1–8.1)

## 2022-11-01 DIAGNOSIS — R73.03 PREDIABETES: ICD-10-CM

## 2022-11-02 RX ORDER — METFORMIN HYDROCHLORIDE 500 MG/1
TABLET, EXTENDED RELEASE ORAL
Qty: 90 TABLET | Refills: 0 | Status: SHIPPED | OUTPATIENT
Start: 2022-11-02

## 2022-11-02 NOTE — TELEPHONE ENCOUNTER
Last refill date: 6/10/22   #/refills: 90 w/ 0   Last OV pertinent to medication 6/14/22  When patient was asked to return for OV: 12/22  Upcomming appt/reason: 12/5/22  Labs:   Reminder sent via my chart.

## 2022-11-29 LAB
ALBUMIN/GLOBULIN RATIO: 1.6 (CALC) (ref 1–2.5)
ALBUMIN: 4.1 G/DL (ref 3.6–5.1)
ALKALINE PHOSPHATASE: 87 U/L (ref 37–153)
ALT: 17 U/L (ref 6–29)
AST: 15 U/L (ref 10–35)
BILIRUBIN, TOTAL: 0.5 MG/DL (ref 0.2–1.2)
BUN: 20 MG/DL (ref 7–25)
CALCIUM: 9.1 MG/DL (ref 8.6–10.4)
CARBON DIOXIDE: 28 MMOL/L (ref 20–32)
CHLORIDE: 108 MMOL/L (ref 98–110)
CHOL/HDLC RATIO: 2.4 (CALC)
CHOLESTEROL, TOTAL: 145 MG/DL
CREATININE: 0.66 MG/DL (ref 0.5–1.03)
EGFR: 102 ML/MIN/1.73M2
GLOBULIN: 2.5 G/DL (CALC) (ref 1.9–3.7)
GLUCOSE: 100 MG/DL (ref 65–99)
HDL CHOLESTEROL: 61 MG/DL
HEMOGLOBIN A1C: 5.5 % OF TOTAL HGB
LDL-CHOLESTEROL: 65 MG/DL (CALC)
NON-HDL CHOLESTEROL: 84 MG/DL (CALC)
POTASSIUM: 4.5 MMOL/L (ref 3.5–5.3)
PROTEIN, TOTAL: 6.6 G/DL (ref 6.1–8.1)
SODIUM: 143 MMOL/L (ref 135–146)
TRIGLYCERIDES: 109 MG/DL
VITAMIN D, 25-OH, TOTAL: 58 NG/ML (ref 30–100)

## 2022-12-05 ENCOUNTER — OFFICE VISIT (OUTPATIENT)
Dept: INTERNAL MEDICINE CLINIC | Facility: CLINIC | Age: 57
End: 2022-12-05
Payer: COMMERCIAL

## 2022-12-05 VITALS
HEART RATE: 76 BPM | DIASTOLIC BLOOD PRESSURE: 70 MMHG | RESPIRATION RATE: 12 BRPM | TEMPERATURE: 98 F | SYSTOLIC BLOOD PRESSURE: 110 MMHG | BODY MASS INDEX: 34.6 KG/M2 | WEIGHT: 205.19 LBS | HEIGHT: 64.75 IN

## 2022-12-05 DIAGNOSIS — Z00.00 PHYSICAL EXAM, ANNUAL: Primary | ICD-10-CM

## 2022-12-05 DIAGNOSIS — I10 ESSENTIAL HYPERTENSION: ICD-10-CM

## 2022-12-05 DIAGNOSIS — R73.03 PREDIABETES: ICD-10-CM

## 2022-12-05 DIAGNOSIS — E78.00 HYPERCHOLESTEREMIA: ICD-10-CM

## 2022-12-05 DIAGNOSIS — E89.0 POSTABLATIVE HYPOTHYROIDISM: ICD-10-CM

## 2022-12-05 DIAGNOSIS — Z12.31 ENCOUNTER FOR SCREENING MAMMOGRAM FOR BREAST CANCER: ICD-10-CM

## 2022-12-05 DIAGNOSIS — Z12.11 SCREENING FOR COLON CANCER: ICD-10-CM

## 2022-12-05 PROCEDURE — 3074F SYST BP LT 130 MM HG: CPT | Performed by: INTERNAL MEDICINE

## 2022-12-05 PROCEDURE — 3078F DIAST BP <80 MM HG: CPT | Performed by: INTERNAL MEDICINE

## 2022-12-05 PROCEDURE — 3008F BODY MASS INDEX DOCD: CPT | Performed by: INTERNAL MEDICINE

## 2022-12-05 PROCEDURE — 99396 PREV VISIT EST AGE 40-64: CPT | Performed by: INTERNAL MEDICINE

## 2022-12-16 ENCOUNTER — TELEPHONE (OUTPATIENT)
Dept: INTERNAL MEDICINE CLINIC | Facility: CLINIC | Age: 57
End: 2022-12-16

## 2022-12-19 ENCOUNTER — HOSPITAL ENCOUNTER (OUTPATIENT)
Dept: MAMMOGRAPHY | Age: 57
Discharge: HOME | End: 2022-12-19
Attending: INTERNAL MEDICINE
Payer: COMMERCIAL

## 2022-12-19 DIAGNOSIS — Z12.31 ENCOUNTER FOR SCREENING MAMMOGRAM FOR BREAST CANCER: ICD-10-CM

## 2022-12-19 PROCEDURE — 77067 SCR MAMMO BI INCL CAD: CPT | Performed by: INTERNAL MEDICINE

## 2022-12-19 PROCEDURE — 77063 BREAST TOMOSYNTHESIS BI: CPT | Performed by: INTERNAL MEDICINE

## 2022-12-21 ENCOUNTER — MED REC SCAN ONLY (OUTPATIENT)
Dept: INTERNAL MEDICINE CLINIC | Facility: CLINIC | Age: 57
End: 2022-12-21

## 2022-12-28 ENCOUNTER — TELEPHONE (OUTPATIENT)
Dept: INTERNAL MEDICINE CLINIC | Facility: CLINIC | Age: 57
End: 2022-12-28

## 2022-12-28 NOTE — TELEPHONE ENCOUNTER
Incoming (mail or fax):  fax  Received from:  [facility]  Documentation given to:  [address] [facility] record request

## 2022-12-30 NOTE — TELEPHONE ENCOUNTER
Medical Record Request Received    Date Received in Office: 12/28/22    Records Request Received From: [facility] processing     Date Sent to Scan Stat: 1/3/23    STAT Request?: no     Copy made for Provider & place in incoming folder: N/A

## 2023-01-02 DIAGNOSIS — E78.5 HYPERLIPIDEMIA, UNSPECIFIED HYPERLIPIDEMIA TYPE: ICD-10-CM

## 2023-01-03 RX ORDER — ROSUVASTATIN CALCIUM 5 MG/1
TABLET, COATED ORAL
Qty: 90 TABLET | Refills: 1 | Status: SHIPPED | OUTPATIENT
Start: 2023-01-03

## 2023-01-04 DIAGNOSIS — J30.9 ALLERGIC RHINITIS, UNSPECIFIED SEASONALITY, UNSPECIFIED TRIGGER: ICD-10-CM

## 2023-01-04 RX ORDER — FLUTICASONE PROPIONATE 50 MCG
SPRAY, SUSPENSION (ML) NASAL
Qty: 48 ML | Refills: 0 | Status: SHIPPED | OUTPATIENT
Start: 2023-01-04

## 2023-01-18 DIAGNOSIS — I10 ESSENTIAL HYPERTENSION: ICD-10-CM

## 2023-01-18 RX ORDER — LISINOPRIL 10 MG/1
TABLET ORAL
Qty: 90 TABLET | Refills: 1 | Status: SHIPPED | OUTPATIENT
Start: 2023-01-18

## 2023-01-30 ENCOUNTER — OFFICE VISIT (OUTPATIENT)
Dept: OBGYN CLINIC | Facility: CLINIC | Age: 58
End: 2023-01-30
Payer: COMMERCIAL

## 2023-01-30 VITALS
HEART RATE: 77 BPM | WEIGHT: 208.5 LBS | HEIGHT: 64.75 IN | BODY MASS INDEX: 35.16 KG/M2 | DIASTOLIC BLOOD PRESSURE: 80 MMHG | SYSTOLIC BLOOD PRESSURE: 116 MMHG

## 2023-01-30 DIAGNOSIS — Z01.419 WELL WOMAN EXAM WITH ROUTINE GYNECOLOGICAL EXAM: Primary | ICD-10-CM

## 2023-01-30 DIAGNOSIS — Z12.4 SCREENING FOR CERVICAL CANCER: ICD-10-CM

## 2023-01-30 PROCEDURE — 3074F SYST BP LT 130 MM HG: CPT | Performed by: OBSTETRICS & GYNECOLOGY

## 2023-01-30 PROCEDURE — 3008F BODY MASS INDEX DOCD: CPT | Performed by: OBSTETRICS & GYNECOLOGY

## 2023-01-30 PROCEDURE — 3079F DIAST BP 80-89 MM HG: CPT | Performed by: OBSTETRICS & GYNECOLOGY

## 2023-01-30 PROCEDURE — 99396 PREV VISIT EST AGE 40-64: CPT | Performed by: OBSTETRICS & GYNECOLOGY

## 2023-01-31 DIAGNOSIS — R73.03 PREDIABETES: ICD-10-CM

## 2023-02-01 LAB — HPV MRNA E6/E7: NOT DETECTED

## 2023-02-01 RX ORDER — METFORMIN HYDROCHLORIDE 500 MG/1
TABLET, EXTENDED RELEASE ORAL
Qty: 90 TABLET | Refills: 1 | Status: SHIPPED | OUTPATIENT
Start: 2023-02-01

## 2023-02-01 NOTE — TELEPHONE ENCOUNTER
Diabetic Medication Protocol Passed 01/31/2023 10:24 AM   Protocol Details  HgBA1C procedure resulted in past 6 months    Last HgBA1C < 7.5    Microalbumin procedure in past 12 months or taking ACE/ARB    Appointment in past 6 or next 3 months     No future appointments.

## 2023-04-10 DIAGNOSIS — J30.9 ALLERGIC RHINITIS, UNSPECIFIED SEASONALITY, UNSPECIFIED TRIGGER: ICD-10-CM

## 2023-04-10 RX ORDER — FLUTICASONE PROPIONATE 50 MCG
SPRAY, SUSPENSION (ML) NASAL
Qty: 16 ML | Refills: 2 | Status: SHIPPED | OUTPATIENT
Start: 2023-04-10

## 2023-04-11 DIAGNOSIS — J45.20 MILD INTERMITTENT ASTHMA WITHOUT COMPLICATION: ICD-10-CM

## 2023-04-12 RX ORDER — ALBUTEROL SULFATE 90 UG/1
AEROSOL, METERED RESPIRATORY (INHALATION)
Qty: 6.7 G | Refills: 1 | Status: SHIPPED | OUTPATIENT
Start: 2023-04-12

## 2023-04-29 ENCOUNTER — OFFICE VISIT (OUTPATIENT)
Dept: FAMILY MEDICINE CLINIC | Facility: CLINIC | Age: 58
End: 2023-04-29
Payer: COMMERCIAL

## 2023-04-29 VITALS
DIASTOLIC BLOOD PRESSURE: 76 MMHG | HEIGHT: 64.75 IN | OXYGEN SATURATION: 97 % | TEMPERATURE: 99 F | WEIGHT: 208 LBS | BODY MASS INDEX: 35.08 KG/M2 | SYSTOLIC BLOOD PRESSURE: 112 MMHG | HEART RATE: 81 BPM

## 2023-04-29 DIAGNOSIS — J06.9 VIRAL URI WITH COUGH: Primary | ICD-10-CM

## 2023-04-29 DIAGNOSIS — Z87.09 HX OF EXTRINSIC ASTHMA: ICD-10-CM

## 2023-04-29 LAB
OPERATOR ID: NORMAL
POCT LOT NUMBER: NORMAL
RAPID SARS-COV-2 BY PCR: NOT DETECTED

## 2023-04-29 PROCEDURE — 3008F BODY MASS INDEX DOCD: CPT | Performed by: NURSE PRACTITIONER

## 2023-04-29 PROCEDURE — 3074F SYST BP LT 130 MM HG: CPT | Performed by: NURSE PRACTITIONER

## 2023-04-29 PROCEDURE — U0002 COVID-19 LAB TEST NON-CDC: HCPCS | Performed by: NURSE PRACTITIONER

## 2023-04-29 PROCEDURE — 99213 OFFICE O/P EST LOW 20 MIN: CPT | Performed by: NURSE PRACTITIONER

## 2023-04-29 PROCEDURE — 3078F DIAST BP <80 MM HG: CPT | Performed by: NURSE PRACTITIONER

## 2023-04-29 RX ORDER — BENZONATATE 200 MG/1
200 CAPSULE ORAL 3 TIMES DAILY PRN
Qty: 30 CAPSULE | Refills: 0 | Status: SHIPPED | OUTPATIENT
Start: 2023-04-29

## 2023-05-04 ENCOUNTER — TELEPHONE (OUTPATIENT)
Dept: INTERNAL MEDICINE CLINIC | Facility: CLINIC | Age: 58
End: 2023-05-04

## 2023-05-04 NOTE — TELEPHONE ENCOUNTER
Pt called and is coming in Monday. Pt went to [facility] 4/29/23 since then fever is gone but still no relief from sinus symptoms. Pt would like to know if we have any recommendation for OTC medication she can take until she comes in.  Pt says mucinex is doing nothing for her

## 2023-05-04 NOTE — TELEPHONE ENCOUNTER
Pt states that Fever  And sinus congestion started on Thursday, fever stopped Saturday. Temperature max was 99.6. Went to [facility]  4/29/23 with diagnosis of vial URI, covid test Neg. Was prescribed benzonate and advised to albuterol every 4 hrs to help cough. Pt states benzonatate was not helpful and she does not want to use her albuterol inhaler that often. She states she always needs antibiotics whenever she gets sick. Today she is concerned about persistent  stuffy nose and dry cough, occipital headache ion arising in the morning that goes way during the day. Denies fever, chest pain, SOB, nausea, vomiting, abdominal pain, diarrhea. No increase in asthma symptoms. Reviewed home care for sinus congestion/cold symptoms, when to seek emergency care. Dont take any OTC decongestants except coricidin HBP. Pt verbalizes understanding, will keep appt for Monday as scheduled.     Future Appointments   Date Time Provider Clyde Ramires   5/8/2023 11:20 AM Dov Spann MD [facility] [address] [place]   7/31/2023  9:15 AM Doreen Kaba MD [id number] [address]

## 2023-05-08 ENCOUNTER — OFFICE VISIT (OUTPATIENT)
Dept: INTERNAL MEDICINE CLINIC | Facility: CLINIC | Age: 58
End: 2023-05-08
Payer: COMMERCIAL

## 2023-05-08 VITALS
OXYGEN SATURATION: 98 % | HEART RATE: 76 BPM | TEMPERATURE: 98 F | HEIGHT: 64.5 IN | DIASTOLIC BLOOD PRESSURE: 80 MMHG | WEIGHT: 211.38 LBS | RESPIRATION RATE: 16 BRPM | SYSTOLIC BLOOD PRESSURE: 142 MMHG | BODY MASS INDEX: 35.65 KG/M2

## 2023-05-08 DIAGNOSIS — J45.20 MILD INTERMITTENT ASTHMA WITHOUT COMPLICATION: ICD-10-CM

## 2023-05-08 DIAGNOSIS — R05.1 ACUTE COUGH: Primary | ICD-10-CM

## 2023-05-08 PROCEDURE — 99214 OFFICE O/P EST MOD 30 MIN: CPT | Performed by: INTERNAL MEDICINE

## 2023-05-08 PROCEDURE — 3077F SYST BP >= 140 MM HG: CPT | Performed by: INTERNAL MEDICINE

## 2023-05-08 PROCEDURE — 3008F BODY MASS INDEX DOCD: CPT | Performed by: INTERNAL MEDICINE

## 2023-05-08 PROCEDURE — 3079F DIAST BP 80-89 MM HG: CPT | Performed by: INTERNAL MEDICINE

## 2023-05-08 RX ORDER — CODEINE PHOSPHATE AND GUAIFENESIN 10; 100 MG/5ML; MG/5ML
SOLUTION ORAL NIGHTLY PRN
Qty: 180 ML | Refills: 0 | Status: SHIPPED | OUTPATIENT
Start: 2023-05-08

## 2023-05-08 RX ORDER — METHYLPREDNISOLONE 4 MG/1
TABLET ORAL
Qty: 1 EACH | Refills: 0 | Status: SHIPPED | OUTPATIENT
Start: 2023-05-08

## 2023-07-10 DIAGNOSIS — J30.9 ALLERGIC RHINITIS, UNSPECIFIED SEASONALITY, UNSPECIFIED TRIGGER: ICD-10-CM

## 2023-07-11 RX ORDER — FLUTICASONE PROPIONATE 50 MCG
SPRAY, SUSPENSION (ML) NASAL
Qty: 16 ML | Refills: 0 | Status: SHIPPED | OUTPATIENT
Start: 2023-07-11

## 2023-07-11 NOTE — TELEPHONE ENCOUNTER
Allergy Medication Protocol Gpgrio96/10/2023 12:29 AM    Appointment in the past 12 or next 3 months        Future Appointments   Date Time Provider Clyde Buchanani   7/31/2023  9:15 AM Aicha England MD [address] [address]   8/7/2023  9:00 AM PF [facility] PF [facility]   8/14/2023 12:00 PM Petra Arenas MD [facility] 29 [facility] [address] [place]   Refilled per protocol.

## 2023-07-19 ENCOUNTER — TELEPHONE (OUTPATIENT)
Dept: INTERNAL MEDICINE CLINIC | Facility: CLINIC | Age: 58
End: 2023-07-19

## 2023-07-19 DIAGNOSIS — I10 ESSENTIAL HYPERTENSION: ICD-10-CM

## 2023-07-19 NOTE — TELEPHONE ENCOUNTER
Patient does not have type 2 DM. Eye exam received reveals this. I did speak to the staff at [place] [address] office. She will send message to  to edit her exam minus that diagnosis and send up a new one.  Also to remove diagnosis on her list.

## 2023-07-20 RX ORDER — LISINOPRIL 10 MG/1
TABLET ORAL
Qty: 90 TABLET | Refills: 0 | Status: SHIPPED | OUTPATIENT
Start: 2023-07-20

## 2023-07-20 NOTE — TELEPHONE ENCOUNTER
Name from pharmacy: LISINOPRIL 10 MG TABLET         Will file in chart as: LISINOPRIL 10 MG Oral Tab    Sig: TAKE 1 TABLET BY MOUTH EVERY DAY    Disp: 90 tablet    Refills: 1    Start: 7/19/2023    Class: Normal    Non-formulary For: Essential hypertension    Last ordered: 6 months ago by Tameka Kingston MD Last refill: 4/20/2023    Rx #: [id number]    Hypertension Medications Protocol Fmybpp8607/19/2023 02:18 AM   Protocol Details CMP or BMP in past 12 months    Last serum creatinine< 2.0    Appointment in past 6 or next 3 months      To be filled at: [facility]/pharmacy #[id number]- [place] Sanchez, [address].  AT [address], [phone], [phone]     Future Appointments   Date Time Provider Clyde Ramires   7/31/2023  9:15 AM Bronson [place] [address] [address] SUB GI   8/7/2023  9:00 AM PF [facility] PF [facility]   8/14/2023 12:00 PM Barbara Moulton MD EM[facility] 29 EMG Samantha Body

## 2023-07-24 DIAGNOSIS — E78.5 HYPERLIPIDEMIA, UNSPECIFIED HYPERLIPIDEMIA TYPE: ICD-10-CM

## 2023-07-24 RX ORDER — ROSUVASTATIN CALCIUM 5 MG/1
TABLET, COATED ORAL
Qty: 90 TABLET | Refills: 0 | Status: SHIPPED | OUTPATIENT
Start: 2023-07-24

## 2023-07-24 NOTE — TELEPHONE ENCOUNTER
Pediatric Surgery  [facility]  [address]  T [phone]  F [phone]        To Whom it May Concern,     Sathya Gamble is a 8 year old male who was admitted at [facility] from 5/13/2023 until 5/16/2023  The patient is being discharged 5/16/2023. This admission was not COVID-related.    SCHOOL/WORK:  The patient should be excused from school/work until 5/18, though he/she may return sooner if they feel well enough.    ACTIVTY:   No restrictions        MJ Lam PA-C Sarah Cannon, APN  [facility] Pediatric General Surgery     Name from pharmacy: ROSUVASTATIN CALCIUM 5 MG TAB         Will file in chart as: ROSUVASTATIN 5 MG Oral Tab    Sig: TAKE 1 TABLET BY MOUTH EVERY DAY AT NIGHT    Disp: 90 tablet    Refills: 1    Start: 7/24/2023    Class: Normal    Non-formulary For: Hyperlipidemia, unspecified hyperlipidemia type    Last ordered: 6 months ago by Shelly Urbina MD Last refill: 4/28/2023    Rx #: [id number]    Cholesterol Medication Protocol Soqhpy2607/24/2023 12:39 AM   Protocol Details ALT < 80    ALT resulted within past year    Lipid panel within past 12 months    Appointment within past 12 or next 3 months      To be filled at: [facility]/pharmacy #[id number]- Michael Val, [address].  AT [address], [phone], [phone]     Future Appointments   Date Time Provider Clyde Ramires   7/31/2023  9:15 AM [place]rade Wakefield MD [address] [address] SUB GI   8/7/2023  9:00 AM PF [facility] PF [facility]   8/14/2023 12:00 PM Gaetano Snellen, MD EM[facility] 29 EMG Tawana Eugene

## 2023-07-29 LAB
ABSOLUTE BASOPHILS: 20 CELLS/UL (ref 0–200)
ABSOLUTE EOSINOPHILS: 80 CELLS/UL (ref 15–500)
ABSOLUTE LYMPHOCYTES: 2057 CELLS/UL (ref 850–3900)
ABSOLUTE MONOCYTES: 375 CELLS/UL (ref 200–950)
ABSOLUTE NEUTROPHILS: 4167 CELLS/UL (ref 1500–7800)
ALBUMIN/GLOBULIN RATIO: 1.6 (CALC) (ref 1–2.5)
ALBUMIN: 4 G/DL (ref 3.6–5.1)
ALKALINE PHOSPHATASE: 84 U/L (ref 37–153)
ALT: 15 U/L (ref 6–29)
AST: 15 U/L (ref 10–35)
BASOPHILS: 0.3 %
BILIRUBIN, TOTAL: 0.5 MG/DL (ref 0.2–1.2)
BUN: 19 MG/DL (ref 7–25)
CALCIUM: 9.3 MG/DL (ref 8.6–10.4)
CARBON DIOXIDE: 28 MMOL/L (ref 20–32)
CHLORIDE: 105 MMOL/L (ref 98–110)
CHOL/HDLC RATIO: 3.1 (CALC)
CHOLESTEROL, TOTAL: 176 MG/DL
CREATININE: 0.71 MG/DL (ref 0.5–1.03)
EGFR: 99 ML/MIN/1.73M2
EOSINOPHILS: 1.2 %
GLOBULIN: 2.5 G/DL (CALC) (ref 1.9–3.7)
GLUCOSE: 100 MG/DL (ref 65–99)
HDL CHOLESTEROL: 56 MG/DL
HEMATOCRIT: 35.6 % (ref 35–45)
HEMOGLOBIN A1C: 5.7 % OF TOTAL HGB
HEMOGLOBIN: 11.8 G/DL (ref 11.7–15.5)
LDL-CHOLESTEROL: 98 MG/DL (CALC)
LYMPHOCYTES: 30.7 %
MCH: 30.7 PG (ref 27–33)
MCHC: 33.1 G/DL (ref 32–36)
MCV: 92.7 FL (ref 80–100)
MONOCYTES: 5.6 %
MPV: 11.1 FL (ref 7.5–12.5)
NEUTROPHILS: 62.2 %
NON-HDL CHOLESTEROL: 120 MG/DL (CALC)
PLATELET COUNT: 232 THOUSAND/UL (ref 140–400)
POTASSIUM: 4.4 MMOL/L (ref 3.5–5.3)
PROTEIN, TOTAL: 6.5 G/DL (ref 6.1–8.1)
RDW: 12.8 % (ref 11–15)
RED BLOOD CELL COUNT: 3.84 MILLION/UL (ref 3.8–5.1)
SODIUM: 141 MMOL/L (ref 135–146)
TRIGLYCERIDES: 123 MG/DL
WHITE BLOOD CELL COUNT: 6.7 THOUSAND/UL (ref 3.8–10.8)

## 2023-07-31 PROBLEM — Z86.010 HISTORY OF ADENOMATOUS POLYP OF COLON: Status: ACTIVE | Noted: 2023-07-31

## 2023-07-31 PROBLEM — Z86.0101 HISTORY OF ADENOMATOUS POLYP OF COLON: Status: ACTIVE | Noted: 2023-07-31

## 2023-08-01 DIAGNOSIS — R73.03 PREDIABETES: ICD-10-CM

## 2023-08-01 RX ORDER — METFORMIN HYDROCHLORIDE 500 MG/1
TABLET, EXTENDED RELEASE ORAL
Qty: 90 TABLET | Refills: 0 | Status: SHIPPED | OUTPATIENT
Start: 2023-08-01

## 2023-08-01 NOTE — TELEPHONE ENCOUNTER
Diabetic Medication Protocol Rlxrzy6608/01/2023 10:58 AM   Protocol Details HgBA1C procedure resulted in past 6 months    Last HgBA1C < 7.5    Microalbumin procedure in past 12 months or taking ACE/ARB    Appointment in past 6 or next 3 months          Future Appointments   Date Time Provider Clyde Ramires   8/7/2023  9:00 AM PF [address] [employer]   8/14/2023 12:00 PM Ebony Denney MD EM[facility] 29 EM[facility] N Anand Mishra

## 2023-08-02 DIAGNOSIS — J30.9 ALLERGIC RHINITIS, UNSPECIFIED SEASONALITY, UNSPECIFIED TRIGGER: ICD-10-CM

## 2023-08-02 RX ORDER — FLUTICASONE PROPIONATE 50 MCG
SPRAY, SUSPENSION (ML) NASAL
Qty: 16 ML | Refills: 0 | Status: SHIPPED | OUTPATIENT
Start: 2023-08-02

## 2023-08-02 NOTE — TELEPHONE ENCOUNTER
Allergy Medication Protocol Cztkxt2708/02/2023 08:34 AM    Appointment in the past 12 or next 3 months

## 2023-08-07 ENCOUNTER — HOSPITAL ENCOUNTER (OUTPATIENT)
Dept: ULTRASOUND IMAGING | Age: 58
Discharge: HOME | End: 2023-08-07
Attending: INTERNAL MEDICINE
Payer: COMMERCIAL

## 2023-08-07 DIAGNOSIS — Z12.31 ENCOUNTER FOR SCREENING MAMMOGRAM FOR BREAST CANCER: ICD-10-CM

## 2023-08-07 PROCEDURE — 76641 ULTRASOUND BREAST COMPLETE: CPT | Performed by: INTERNAL MEDICINE

## 2023-08-14 ENCOUNTER — OFFICE VISIT (OUTPATIENT)
Dept: INTERNAL MEDICINE CLINIC | Facility: CLINIC | Age: 58
End: 2023-08-14
Payer: COMMERCIAL

## 2023-08-14 ENCOUNTER — HOSPITAL ENCOUNTER (OUTPATIENT)
Dept: GENERAL RADIOLOGY | Age: 58
Discharge: HOME | End: 2023-08-14
Attending: INTERNAL MEDICINE
Payer: COMMERCIAL

## 2023-08-14 VITALS
HEART RATE: 64 BPM | BODY MASS INDEX: 35.56 KG/M2 | SYSTOLIC BLOOD PRESSURE: 110 MMHG | WEIGHT: 210.88 LBS | TEMPERATURE: 98 F | RESPIRATION RATE: 16 BRPM | HEIGHT: 64.5 IN | DIASTOLIC BLOOD PRESSURE: 70 MMHG

## 2023-08-14 DIAGNOSIS — G47.33 OSA ON CPAP: ICD-10-CM

## 2023-08-14 DIAGNOSIS — I10 ESSENTIAL HYPERTENSION: Primary | ICD-10-CM

## 2023-08-14 DIAGNOSIS — J45.20 MILD INTERMITTENT ASTHMA WITHOUT COMPLICATION: ICD-10-CM

## 2023-08-14 DIAGNOSIS — S99.921A INJURY OF TOE ON RIGHT FOOT, INITIAL ENCOUNTER: ICD-10-CM

## 2023-08-14 DIAGNOSIS — E89.0 POSTABLATIVE HYPOTHYROIDISM: ICD-10-CM

## 2023-08-14 DIAGNOSIS — R73.03 PREDIABETES: ICD-10-CM

## 2023-08-14 DIAGNOSIS — E78.5 HYPERLIPIDEMIA, UNSPECIFIED HYPERLIPIDEMIA TYPE: ICD-10-CM

## 2023-08-14 DIAGNOSIS — Z00.00 PHYSICAL EXAM, ANNUAL: ICD-10-CM

## 2023-08-14 DIAGNOSIS — E78.00 HYPERCHOLESTEREMIA: ICD-10-CM

## 2023-08-14 PROCEDURE — 73660 X-RAY EXAM OF TOE(S): CPT | Performed by: INTERNAL MEDICINE

## 2023-08-26 DIAGNOSIS — I10 ESSENTIAL HYPERTENSION: ICD-10-CM

## 2023-08-26 DIAGNOSIS — E78.5 HYPERLIPIDEMIA, UNSPECIFIED HYPERLIPIDEMIA TYPE: ICD-10-CM

## 2023-08-26 DIAGNOSIS — R73.03 PREDIABETES: ICD-10-CM

## 2023-08-28 RX ORDER — ROSUVASTATIN CALCIUM 5 MG/1
TABLET, COATED ORAL
Qty: 90 TABLET | Refills: 0 | OUTPATIENT
Start: 2023-08-28

## 2023-08-28 RX ORDER — LISINOPRIL 10 MG/1
TABLET ORAL
Qty: 90 TABLET | Refills: 0 | OUTPATIENT
Start: 2023-08-28

## 2023-08-28 RX ORDER — METFORMIN HYDROCHLORIDE 500 MG/1
TABLET, EXTENDED RELEASE ORAL
Qty: 90 TABLET | Refills: 0 | OUTPATIENT
Start: 2023-08-28

## 2023-09-19 DIAGNOSIS — J30.9 ALLERGIC RHINITIS, UNSPECIFIED SEASONALITY, UNSPECIFIED TRIGGER: ICD-10-CM

## 2023-09-20 RX ORDER — FLUTICASONE PROPIONATE 50 MCG
SPRAY, SUSPENSION (ML) NASAL
Qty: 16 ML | Refills: 0 | Status: SHIPPED | OUTPATIENT
Start: 2023-09-20

## 2023-09-20 NOTE — TELEPHONE ENCOUNTER
Allergy Medication Protocol Prjakb1209/19/2023 08:34 AM    Appointment in the past 12 or next 3 months        Future Appointments   Date Time Provider Clyde Keyla   9/25/2023  3:00 PM Duc Aguilar MD [facility] DERM [facility] [place]   10/2/2023  2:40 PM Payton Trinh APRN SGINP ECC SUB GI   1/9/2024  1:00 PM [facility]   2/12/2024 12:00 PM Neida Reina MD EM[facility] 29 EM[facility] [address] [place]

## 2023-09-27 ENCOUNTER — TELEMEDICINE (OUTPATIENT)
Dept: INTERNAL MEDICINE CLINIC | Facility: CLINIC | Age: 58
End: 2023-09-27
Payer: COMMERCIAL

## 2023-09-27 DIAGNOSIS — J45.20 MILD INTERMITTENT ASTHMA WITHOUT COMPLICATION: ICD-10-CM

## 2023-09-27 DIAGNOSIS — U07.1 COVID-19: Primary | ICD-10-CM

## 2023-09-27 PROCEDURE — 99214 OFFICE O/P EST MOD 30 MIN: CPT | Performed by: NURSE PRACTITIONER

## 2023-09-27 RX ORDER — ALBUTEROL SULFATE 90 UG/1
AEROSOL, METERED RESPIRATORY (INHALATION)
Qty: 6.7 G | Refills: 1 | Status: SHIPPED | OUTPATIENT
Start: 2023-09-27

## 2023-09-27 NOTE — PROGRESS NOTES
Virtual video Beata Petersen 211 verbally consents to a Virtual/video Check-In visit on 09/27/23. Patient has been referred to the [facility] website at [email or web address] to review the yearly Consent to Treat document. Patient understands and accepts financial responsibility for any deductible, co-insurance and/or co-pays associated with this service. Duration of the service: 15 minutes    Alphonse Beltran is a 62year old female. CHIEF COMPLAINT   COVID    HPI:   Started Monday into Tuesday. Occasional cough, PND, sore throat, fever, chills, fatigue. She has a hx of asthma- she has had airway tightness and used the alb and it helped. No sob, poor apeptite, n/v, diarrhea, aches, HA, loss of smell or taste, rashes. Tested for COVID today. Current Outpatient Medications   Medication Sig Dispense Refill    FLUTICASONE PROPIONATE 50 MCG/ACT Nasal Suspension SPRAY 2 SPRAYS INTO EACH NOSTRIL EVERY DAY 16 mL 0    metFORMIN  MG Oral Tablet 24 Hr TAKE 1 TABLET BY MOUTH EVERY DAY WITH BREAKFAST 90 tablet 0    rosuvastatin 5 MG Oral Tab TAKE 1 TABLET BY MOUTH EVERY DAY AT NIGHT 90 tablet 0    lisinopril 10 MG Oral Tab TAKE 1 TABLET BY MOUTH EVERY DAY 90 tablet 0    Omeprazole 40 MG Oral Capsule Delayed Release TAKE 1 CAPSULE BY MOUTH EVERY DAY 90 capsule 0    albuterol 108 (90 Base) MCG/ACT Inhalation Aero Soln INHALE 1-2 PUFFS BY MOUTH EVERY 6 HOURS AS NEEDED FOR WHEEZE OR SHORTNESS OF BREATH 6.7 g 1    levothyroxine 125 MCG Oral Tab Take 1 tablet (125 mcg total) by mouth daily. 90 tablet 3    latanoprost 0.005 % Ophthalmic Solution Place 1 drop into both eyes nightly. B Complex Vitamins (VITAMIN B-COMPLEX OR) Take 1 Dose by mouth daily. NON FORMULARY Take 1 tablet by mouth daily. Unforgettable's for cognitive health      Calcium 500-125 MG-UNIT Oral Tab Take by mouth. Vitamin D3 2000 units Oral Cap Take 1 capsule (2,000 Units total) by mouth daily.       loratadine 10 MG Oral Tab Take 1 tablet (10 mg total) by mouth daily. Past Medical History:   Diagnosis Date    Allergic rhinitis     Antral gastritis     Asthma     Cervical dysplasia     Condyloma     Diabetes mellitus (HCC)     Eczema     foot    Esophagitis     candida    Eye disease     Fatigue     GERD (gastroesophageal reflux disease)     chronic heartburn    Heartburn     More than 10 years ago? Hypercholesteremia     Hyperlipidemia     Hyperthyroidism     managed by Dr. Anshul Wilson (oral contraceptive pills) initiation     Oral allergy syndrome 10/22/2019    Allery to pollen that cross reacts with foods such as raw fruits/veggies and nuts. Oral reaction w/ingestion. Does not progress to anaphylaxis. NICOLAS (obstructive sleep apnea) 11/19/19 HST    AHI 27     Other specified disorders of thyroid     Graves Disease    RAD (reactive airway disease)     Rib contusion     Sinusitis     Wears glasses     Weight gain       Social History:  Social History     Socioeconomic History    Marital status: Single   Occupational History    Occupation: ass  [employer]   Tobacco Use    Smoking status: Former     Packs/day: 0.50     Years: 8.00     Additional pack years: 0.00     Total pack years: 4.00     Types: Cigarettes     Quit date: 2013     Years since quitting: 10.7    Smokeless tobacco: Never   Vaping Use    Vaping Use: Never used   Substance and Sexual Activity    Alcohol use: Not Currently    Drug use: No    Sexual activity: Not Currently   Other Topics Concern    Caffeine Concern Yes    Stress Concern No    Weight Concern No    Special Diet No    Exercise Yes    Seat Belt Yes        REVIEW OF SYSTEMS:   See HPI    EXAM:   Limited due to COVID-19  GENERAL: does not sound like they are in any acute distress on the video  LUNGS: They are able to phonate clearly without pausing due to sob, there was no coughing during the visit.   NEURO: Oriented times three      LABS:      Lab Results   Component Value Date WBC 6.7 07/28/2023    RBC 3.84 07/28/2023    HGB 11.8 07/28/2023    HCT 35.6 07/28/2023    MCV 92.7 07/28/2023    MCH 30.7 07/28/2023    MCHC 33.1 07/28/2023    RDW 12.8 07/28/2023     07/28/2023      Lab Results   Component Value Date     (H) 07/28/2023    BUN 19 07/28/2023    BUNCREA NOT APPLICABLE 21/65/3463    CREATSERUM 0.71 07/28/2023    ANIONGAP 5 07/21/2020     11/16/2017    GFRNAA 95 05/24/2022    GFRAA 110 05/24/2022    CA 9.3 07/28/2023    OSMOCALC 291 07/21/2020    ALKPHO 84 07/28/2023    AST 15 07/28/2023    ALT 15 07/28/2023    BILT 0.5 07/28/2023    TP 6.5 07/28/2023    ALB 4.0 07/28/2023    GLOBULIN 2.5 07/28/2023    AGRATIO 1.6 07/28/2023     07/28/2023    K 4.4 07/28/2023     07/28/2023    CO2 28 07/28/2023      Lab Results   Component Value Date    CHOLEST 176 07/28/2023    TRIG 123 07/28/2023    HDL 56 07/28/2023    LDL 98 07/28/2023    VLDL 26 11/16/2017    TCHDLRATIO 3.1 07/28/2023    NONHDLC 120 07/28/2023      Lab Results   Component Value Date    T4F 1.52 01/25/2022    TSH 0.727 01/25/2022      Lab Results   Component Value Date     11/16/2017    A1C 5.7 (H) 07/28/2023       ASSESSMENT AND PLAN:   1. COVID-19  - has covid. Hx of asthma. - start paxlovid, hold statin. See pt instructions for edu  - isolation and supportive care discussed  - to ER as needed for sob  - nirmatrelvir-ritonavir 300-100 MG Oral Tablet Therapy Pack; Take two nirmatrelvir tablets (300mg) with one ritonavir tablet (100mg) together twice daily for 5 days. Dispense: 30 tablet; Refill: 0    2. Mild intermittent asthma without complication  - continue alb as needed. Refill sent. - albuterol 108 (90 Base) MCG/ACT Inhalation Aero Soln; INHALE 1-2 PUFFS BY MOUTH EVERY 6 HOURS AS NEEDED FOR WHEEZE OR SHORTNESS OF BREATH  Dispense: 6.7 g; Refill: 1      The patient indicates understanding of these issues and agrees to the plan.   The patient is asked to return as needed or when routine care is due. Please note that the following visit was completed using two-way, real-time interactive audio and/or video communication. This has been done in good isaiah to provide continuity of care in the best interest of the provider-patient relationship, due to the ongoing public health crisis/national emergency and because of restrictions of visitation. There are limitations of this visit as no physical exam could be performed. Every conscious effort was taken to allow for sufficient and adequate time. This billing was spent on reviewing labs, medications, radiology tests and decision making. Appropriate medical decision-making and tests are ordered as detailed in the plan of care above.

## 2023-09-28 ENCOUNTER — TELEPHONE (OUTPATIENT)
Dept: INTERNAL MEDICINE CLINIC | Facility: CLINIC | Age: 58
End: 2023-09-28

## 2023-09-28 NOTE — TELEPHONE ENCOUNTER
Okay to write letter stating being treated, okay to return on Sunday per CDC guidelines if feeling better and fever free for 24 hours with a mask. Thanks.

## 2023-09-28 NOTE — TELEPHONE ENCOUNTER
Patient states she needs a return to work letter for Naveen Oct 1, 2023 and she also request it to state Leydi Villegas is treating her for covid. She said the letter can go into [place] and she can retrieve it from there.

## 2023-10-17 DIAGNOSIS — R73.03 PREDIABETES: ICD-10-CM

## 2023-10-17 DIAGNOSIS — E78.5 HYPERLIPIDEMIA, UNSPECIFIED HYPERLIPIDEMIA TYPE: ICD-10-CM

## 2023-10-17 DIAGNOSIS — J30.9 ALLERGIC RHINITIS, UNSPECIFIED SEASONALITY, UNSPECIFIED TRIGGER: ICD-10-CM

## 2023-10-17 DIAGNOSIS — I10 ESSENTIAL HYPERTENSION: ICD-10-CM

## 2023-10-18 RX ORDER — FLUTICASONE PROPIONATE 50 MCG
SPRAY, SUSPENSION (ML) NASAL
Qty: 16 ML | Refills: 1 | Status: SHIPPED | OUTPATIENT
Start: 2023-10-18

## 2023-10-18 RX ORDER — METFORMIN HYDROCHLORIDE 500 MG/1
TABLET, EXTENDED RELEASE ORAL
Qty: 90 TABLET | Refills: 0 | Status: SHIPPED | OUTPATIENT
Start: 2023-10-18 | End: 2023-12-26

## 2023-10-18 RX ORDER — LISINOPRIL 10 MG/1
TABLET ORAL
Qty: 90 TABLET | Refills: 1 | Status: SHIPPED | OUTPATIENT
Start: 2023-10-18

## 2023-10-18 RX ORDER — ROSUVASTATIN CALCIUM 5 MG/1
TABLET, COATED ORAL
Qty: 90 TABLET | Refills: 1 | Status: SHIPPED | OUTPATIENT
Start: 2023-10-18

## 2023-10-18 NOTE — TELEPHONE ENCOUNTER
Cholesterol Medication Protocol Xbipph38/17/2023 03:54 PM   Protocol Details ALT < 80    ALT resulted within past year    Lipid panel within past 12 months    Appointment within past 12 or next 3 months   3. Hyperlipidemia, unspecified hyperlipidemia type  Diet controlled. Hypertension Medications Protocol Xiijcj39/17/2023 03:54 PM   Protocol Details CMP or BMP in past 12 months    Last serum creatinine< 2.0    Appointment in past 6 or next 3 months   1. Essential hypertension  Continue meds.    Allergy Medication Protocol Ezsvgo16/17/2023 03:54 PM    Appointment in the past 12 or next 3 months     Future Appointments   Date Time Provider Clyde Ramires   10/30/2023  1:00 PM Last, MELISSA Palma SGINP ECC SUB GI   1/9/2024  1:00 PM PFS KYLE RM1 PFS [facility]   2/12/2024 12:00 PM Petra Arenas MD EM[facility] 29 EM[facility] N [place]

## 2023-10-18 NOTE — TELEPHONE ENCOUNTER
Diabetic Medication Protocol Lpyssv92/17/2023 03:52 PM   Protocol Details HgBA1C procedure resulted in past 6 months    Last HgBA1C < 7.5    Microalbumin procedure in past 12 months or taking ACE/ARB    Appointment in past 6 or next 3 months        \"4. Prediabetes  Low carb diet.   Continue metformin. \"    Rx refilled per protocol.    Last OV relevant to medication: 8.14.23   Last refill date: 8.1.23     #/refills: #90 / 0 refills   When pt was asked to return for OV:   Return in about 6 months (around 2/14/2024) for physical.    Upcoming appt/reason:   Future Appointments   Date Time Provider Department Center   10/30/2023  1:00 PM Last, MELISSA Haas SGINP ECC SUB GI   1/9/2024  1:00 PM PFS KYLE RM1 PFS [facility] [place]   2/12/2024 12:00 PM Carol Ellsworth MD EM[facility] 29 EMG N [place]        Was pt informed of any over due labs: appear utd     Lab Results   Component Value Date     (H) 07/28/2023    BUN 19 07/28/2023    BUNCREA NOT APPLICABLE 07/28/2023    CREATSERUM 0.71 07/28/2023    ANIONGAP 5 07/21/2020     11/16/2017    GFRNAA 95 05/24/2022    GFRAA 110 05/24/2022    CA 9.3 07/28/2023    OSMOCALC 291 07/21/2020    ALKPHO 84 07/28/2023    AST 15 07/28/2023    ALT 15 07/28/2023    BILT 0.5 07/28/2023    TP 6.5 07/28/2023    ALB 4.0 07/28/2023    GLOBULIN 2.5 07/28/2023    AGRATIO 1.6 07/28/2023     07/28/2023    K 4.4 07/28/2023     07/28/2023    CO2 28 07/28/2023       Lab Results   Component Value Date     11/16/2017    A1C 5.7 (H) 07/28/2023

## 2023-10-30 ENCOUNTER — HOSPITAL ENCOUNTER (OUTPATIENT)
Dept: GENERAL RADIOLOGY | Age: 58
Discharge: HOME | End: 2023-10-30
Attending: NURSE PRACTITIONER
Payer: COMMERCIAL

## 2023-10-30 ENCOUNTER — OFFICE VISIT (OUTPATIENT)
Dept: INTERNAL MEDICINE CLINIC | Facility: CLINIC | Age: 58
End: 2023-10-30

## 2023-10-30 VITALS
OXYGEN SATURATION: 99 % | WEIGHT: 218 LBS | BODY MASS INDEX: 35.03 KG/M2 | HEIGHT: 66 IN | SYSTOLIC BLOOD PRESSURE: 108 MMHG | DIASTOLIC BLOOD PRESSURE: 60 MMHG | RESPIRATION RATE: 20 BRPM | TEMPERATURE: 98 F | HEART RATE: 68 BPM

## 2023-10-30 DIAGNOSIS — J06.9 VIRAL URI: Primary | ICD-10-CM

## 2023-10-30 DIAGNOSIS — G89.29 CHRONIC PAIN OF LEFT KNEE: ICD-10-CM

## 2023-10-30 DIAGNOSIS — M25.562 CHRONIC PAIN OF LEFT KNEE: ICD-10-CM

## 2023-10-30 DIAGNOSIS — J45.20 MILD INTERMITTENT ASTHMA WITHOUT COMPLICATION: ICD-10-CM

## 2023-10-30 DIAGNOSIS — J30.9 ALLERGIC RHINITIS, UNSPECIFIED SEASONALITY, UNSPECIFIED TRIGGER: ICD-10-CM

## 2023-10-30 PROCEDURE — 3074F SYST BP LT 130 MM HG: CPT | Performed by: NURSE PRACTITIONER

## 2023-10-30 PROCEDURE — 3078F DIAST BP <80 MM HG: CPT | Performed by: NURSE PRACTITIONER

## 2023-10-30 PROCEDURE — 73564 X-RAY EXAM KNEE 4 OR MORE: CPT | Performed by: NURSE PRACTITIONER

## 2023-10-30 PROCEDURE — 3008F BODY MASS INDEX DOCD: CPT | Performed by: NURSE PRACTITIONER

## 2023-10-30 PROCEDURE — 99214 OFFICE O/P EST MOD 30 MIN: CPT | Performed by: NURSE PRACTITIONER

## 2023-10-30 RX ORDER — PREDNISONE 20 MG/1
40 TABLET ORAL DAILY
Qty: 10 TABLET | Refills: 0 | Status: SHIPPED | OUTPATIENT
Start: 2023-10-30

## 2023-10-30 NOTE — PATIENT INSTRUCTIONS
Get the x-ray of the left knee done today. You can use Robitussin DM or Mucinex DM as directed on the bottle for cough and chest congestion. Continue Flonase nasal spray. Switch from Claritin to Zyrtec. Use Tylenol as needed for pain or fever. Stay hydrated. If symptoms such as wheezing or chest congestion worsens, start the prednisone. Take it with food. Do not lay down for 1 hour after taking it. Monitor for side effects including stomach pain, acid reflux, trouble sleeping, and anxiety. Go to ER or call 911 if worsening symptoms such as persistent fever >103, difficulty breathing, or chest pain.     Infection prevention:  - Proper hand hygiene is very important          - Wear a mask in public  - Avoid touching your mouth, nose, eyes, and face  - Practice social distancing and staying 6 ft away from other individuals  - Cough into your arm or a tissue  - Avoid contact with others if you have symptoms of an upper or lower respiratory infection  - Avoid contact with others who are ill  - Avoid direct contact with your pets if you are ill  - Disinfect surfaces with appropriate materials  - If your symptoms become severe (shortness of breath with rest or activity, fever, chest congestion, productive cough), go to the ER  - Recommend self-isolation at home if you are sick, wearing a mask if in room with other family members

## 2023-11-11 DIAGNOSIS — R73.03 PREDIABETES: ICD-10-CM

## 2023-11-14 RX ORDER — METFORMIN HYDROCHLORIDE 500 MG/1
TABLET, EXTENDED RELEASE ORAL
Qty: 90 TABLET | Refills: 0 | OUTPATIENT
Start: 2023-11-14

## 2023-11-29 DIAGNOSIS — J30.9 ALLERGIC RHINITIS, UNSPECIFIED SEASONALITY, UNSPECIFIED TRIGGER: ICD-10-CM

## 2023-11-30 ENCOUNTER — HOSPITAL ENCOUNTER (OUTPATIENT)
Dept: GENERAL RADIOLOGY | Age: 58
Discharge: HOME | End: 2023-11-30
Attending: NURSE PRACTITIONER
Payer: COMMERCIAL

## 2023-11-30 ENCOUNTER — OFFICE VISIT (OUTPATIENT)
Dept: INTERNAL MEDICINE CLINIC | Facility: CLINIC | Age: 58
End: 2023-11-30
Payer: COMMERCIAL

## 2023-11-30 VITALS
BODY MASS INDEX: 35.2 KG/M2 | HEART RATE: 80 BPM | DIASTOLIC BLOOD PRESSURE: 70 MMHG | OXYGEN SATURATION: 97 % | HEIGHT: 66 IN | WEIGHT: 219 LBS | RESPIRATION RATE: 16 BRPM | TEMPERATURE: 98 F | SYSTOLIC BLOOD PRESSURE: 118 MMHG

## 2023-11-30 DIAGNOSIS — J45.21 MILD INTERMITTENT ASTHMA WITH ACUTE EXACERBATION: ICD-10-CM

## 2023-11-30 DIAGNOSIS — R05.2 SUBACUTE COUGH: ICD-10-CM

## 2023-11-30 DIAGNOSIS — J45.21 MILD INTERMITTENT ASTHMA WITH ACUTE EXACERBATION: Primary | ICD-10-CM

## 2023-11-30 PROCEDURE — 3074F SYST BP LT 130 MM HG: CPT | Performed by: NURSE PRACTITIONER

## 2023-11-30 PROCEDURE — 3008F BODY MASS INDEX DOCD: CPT | Performed by: NURSE PRACTITIONER

## 2023-11-30 PROCEDURE — 99214 OFFICE O/P EST MOD 30 MIN: CPT | Performed by: NURSE PRACTITIONER

## 2023-11-30 PROCEDURE — 3078F DIAST BP <80 MM HG: CPT | Performed by: NURSE PRACTITIONER

## 2023-11-30 PROCEDURE — 71046 X-RAY EXAM CHEST 2 VIEWS: CPT | Performed by: NURSE PRACTITIONER

## 2023-11-30 RX ORDER — FLUTICASONE PROPIONATE 50 MCG
SPRAY, SUSPENSION (ML) NASAL
Qty: 3 EACH | Refills: 1 | Status: SHIPPED | OUTPATIENT
Start: 2023-11-30

## 2023-11-30 RX ORDER — NEBULIZER ACCESSORIES
KIT MISCELLANEOUS
Qty: 1 EACH | Refills: 0 | Status: SHIPPED | OUTPATIENT
Start: 2023-11-30

## 2023-11-30 RX ORDER — PREDNISONE 20 MG/1
40 TABLET ORAL DAILY
Qty: 10 TABLET | Refills: 0 | Status: SHIPPED | OUTPATIENT
Start: 2023-11-30 | End: 2023-12-05

## 2023-11-30 RX ORDER — ALBUTEROL SULFATE 2.5 MG/3ML
2.5 SOLUTION RESPIRATORY (INHALATION) EVERY 6 HOURS PRN
Qty: 60 EACH | Refills: 0 | Status: SHIPPED | OUTPATIENT
Start: 2023-11-30

## 2023-11-30 RX ORDER — CODEINE PHOSPHATE AND GUAIFENESIN 10; 100 MG/5ML; MG/5ML
10 SOLUTION ORAL NIGHTLY PRN
Qty: 180 ML | Refills: 0 | Status: SHIPPED | OUTPATIENT
Start: 2023-11-30

## 2023-12-19 NOTE — ED NOTES
Patient called inquring her prednisone dose. Pharmacy only had 5mg po so she will take 8 tablets daily for 4 days.
details…

## 2023-12-24 DIAGNOSIS — R73.03 PREDIABETES: ICD-10-CM

## 2023-12-26 RX ORDER — METFORMIN HYDROCHLORIDE 500 MG/1
TABLET, EXTENDED RELEASE ORAL
Qty: 90 TABLET | Refills: 0 | Status: SHIPPED | OUTPATIENT
Start: 2023-12-26

## 2023-12-26 NOTE — TELEPHONE ENCOUNTER
Name from pharmacy: METFORMIN HCL  MG TABLET          Will file in chart as: METFORMIN  MG Oral Tablet 24 Hr    Sig: TAKE 1 TABLET BY MOUTH EVERY DAY WITH BREAKFAST    Disp: 90 tablet    Refills: 0 (Pharmacy requested: Not specified)    Start: 12/24/2023    Class: Normal    Non-formulary For: Prediabetes    To pharmacy: DX Code Needed  REFILLS NEEDED. Last ordered: 2 months ago (10/18/2023) by Marissa Harrison MD    Last refill: 10/18/2023    Rx #: [id number]    Diabetic Medication Protocol [id number]/24/2023 02:25 PM   Protocol Details HgBA1C procedure resulted in past 6 months    Last HgBA1C < 7.5    Microalbumin procedure in past 12 months or taking ACE/ARB    Appointment in past 6 or next 3 months      To be filled at: [facility]/pharmacy #[id number]- [facility], [address]  AT [address], [phone], [phone]     Future Appointments   Date Time Provider Clyde Ramires   1/9/2024  1:00 PM PFS KYLE RM1 PFS M[facility]   2/12/2024 12:00 PM Morales Salinas MD [facility] [place]   10/21/2024  1:00 PM Last, MELISSA Cutler [facility]P [facility] GI

## 2024-02-06 LAB
ABSOLUTE BASOPHILS: 31 CELLS/UL (ref 0–200)
ABSOLUTE EOSINOPHILS: 92 CELLS/UL (ref 15–500)
ABSOLUTE LYMPHOCYTES: 1958 CELLS/UL (ref 850–3900)
ABSOLUTE MONOCYTES: 366 CELLS/UL (ref 200–950)
ABSOLUTE NEUTROPHILS: 3654 CELLS/UL (ref 1500–7800)
ALBUMIN/GLOBULIN RATIO: 1.6 (CALC) (ref 1–2.5)
ALBUMIN: 3.9 G/DL (ref 3.6–5.1)
ALKALINE PHOSPHATASE: 78 U/L (ref 37–153)
ALT: 15 U/L (ref 6–29)
AST: 14 U/L (ref 10–35)
BASOPHILS: 0.5 %
BILIRUBIN, TOTAL: 0.4 MG/DL (ref 0.2–1.2)
BUN: 20 MG/DL (ref 7–25)
CALCIUM: 9 MG/DL (ref 8.6–10.4)
CARBON DIOXIDE: 28 MMOL/L (ref 20–32)
CHLORIDE: 107 MMOL/L (ref 98–110)
CHOL/HDLC RATIO: 2.6 (CALC)
CHOLESTEROL, TOTAL: 157 MG/DL
CREATININE: 0.69 MG/DL (ref 0.5–1.03)
EGFR: 101 ML/MIN/1.73M2
EOSINOPHILS: 1.5 %
GLOBULIN: 2.4 G/DL (CALC) (ref 1.9–3.7)
GLUCOSE: 99 MG/DL (ref 65–99)
HDL CHOLESTEROL: 60 MG/DL
HEMATOCRIT: 36.3 % (ref 35–45)
HEMOGLOBIN A1C: 5.7 % OF TOTAL HGB
HEMOGLOBIN: 11.6 G/DL (ref 11.7–15.5)
LDL-CHOLESTEROL: 78 MG/DL (CALC)
LYMPHOCYTES: 32.1 %
MCH: 29.8 PG (ref 27–33)
MCHC: 32 G/DL (ref 32–36)
MCV: 93.3 FL (ref 80–100)
MONOCYTES: 6 %
MPV: 11.2 FL (ref 7.5–12.5)
NEUTROPHILS: 59.9 %
NON-HDL CHOLESTEROL: 97 MG/DL (CALC)
PLATELET COUNT: 240 THOUSAND/UL (ref 140–400)
POTASSIUM: 4.4 MMOL/L (ref 3.5–5.3)
PROTEIN, TOTAL: 6.3 G/DL (ref 6.1–8.1)
RDW: 12.5 % (ref 11–15)
RED BLOOD CELL COUNT: 3.89 MILLION/UL (ref 3.8–5.1)
SODIUM: 141 MMOL/L (ref 135–146)
TRIGLYCERIDES: 102 MG/DL
WHITE BLOOD CELL COUNT: 6.1 THOUSAND/UL (ref 3.8–10.8)

## 2024-02-07 ENCOUNTER — TELEMEDICINE (OUTPATIENT)
Dept: INTERNAL MEDICINE CLINIC | Facility: CLINIC | Age: 59
End: 2024-02-07
Payer: COMMERCIAL

## 2024-02-07 VITALS — WEIGHT: 210 LBS | BODY MASS INDEX: 34 KG/M2

## 2024-02-07 DIAGNOSIS — B00.1 COLD SORE: Primary | ICD-10-CM

## 2024-02-07 PROCEDURE — 99213 OFFICE O/P EST LOW 20 MIN: CPT | Performed by: NURSE PRACTITIONER

## 2024-02-07 RX ORDER — VALACYCLOVIR HYDROCHLORIDE 1 G/1
2000 TABLET, FILM COATED ORAL EVERY 12 HOURS SCHEDULED
Qty: 4 TABLET | Refills: 0 | Status: SHIPPED | OUTPATIENT
Start: 2024-02-07 | End: 2024-02-08

## 2024-02-07 NOTE — PATIENT INSTRUCTIONS
Take the medication as directed.  Monitor effectiveness, for side effects, for allergy.    You may also apply ice for the swelling as needed    Follow-up as planned or sooner if needed

## 2024-02-07 NOTE — PROGRESS NOTES
Virtual video Check-In    Jolie Orellana verbally consents to a Virtual/video Check-In visit on 02/07/24.  Patient has been referred to the [facility] website at [email or web address] to review the yearly Consent to Treat document.    Patient understands and accepts financial responsibility for any deductible, co-insurance and/or co-pays associated with this service.    Duration of the service: 5 minutes    Jolie Orellana is a 58 year old female.  CHIEF COMPLAINT   Cold sore    HPI:   The patient has a history of cold sores.  She does not have outbreaks often maybe once a year.  Yesterday she began to have a cold sore and today it is worsening.  It is swollen and painful.   She is wondering if she can have a prescription for the medication to help it.    Current Outpatient Medications   Medication Sig Dispense Refill           METFORMIN  MG Oral Tablet 24 Hr TAKE 1 TABLET BY MOUTH EVERY DAY WITH BREAKFAST 90 tablet 0    fluticasone propionate 50 MCG/ACT Nasal Suspension SPRAY 2 SPRAYS INTO EACH NOSTRIL EVERY DAY 3 each 1    albuterol (2.5 MG/3ML) 0.083% Inhalation Nebu Soln Take 3 mL (2.5 mg total) by nebulization every 6 (six) hours as needed for Wheezing. 60 each 0    Omeprazole 40 MG Oral Capsule Delayed Release TAKE 1 CAPSULE BY MOUTH EVERY DAY 90 capsule 3    rosuvastatin 5 MG Oral Tab TAKE 1 TABLET BY MOUTH EVERY DAY AT NIGHT 90 tablet 1    lisinopril 10 MG Oral Tab TAKE 1 TABLET BY MOUTH EVERY DAY 90 tablet 1    albuterol 108 (90 Base) MCG/ACT Inhalation Aero Soln INHALE 1-2 PUFFS BY MOUTH EVERY 6 HOURS AS NEEDED FOR WHEEZE OR SHORTNESS OF BREATH 6.7 g 1    levothyroxine 125 MCG Oral Tab Take 1 tablet (125 mcg total) by mouth daily. 90 tablet 3    latanoprost 0.005 % Ophthalmic Solution Place 1 drop into both eyes nightly.      B Complex Vitamins (VITAMIN B-COMPLEX OR) Take 1 Dose by mouth daily.      NON FORMULARY Take 1 tablet by mouth daily. Unforgettable's for cognitive health      Calcium 500-125  MG-UNIT Oral Tab Take by mouth.      Vitamin D3 2000 units Oral Cap Take 1 capsule (2,000 Units total) by mouth daily.      guaiFENesin-codeine (CHERATUSSIN AC) 100-10 MG/5ML Oral Solution Take 10 mL by mouth nightly as needed for cough. (Patient not taking: Reported on 2024) 180 mL 0    Respiratory Therapy Supplies (NEBULIZER/TUBING/MOUTHPIECE) Does not apply Kit Needs nebulizer machine and supplies for albuterol nebulizer (Patient not taking: Reported on 2024) 1 each 0      Past Medical History:   Diagnosis Date    Allergic rhinitis     Antral gastritis     Asthma     Cervical dysplasia     Condyloma     Diabetes mellitus (HCC)     Eczema     foot    Esophagitis     candida    Eye disease     Fatigue     GERD (gastroesophageal reflux disease)     chronic heartburn    Heartburn     More than 10 years ago?    High cholesterol     Hypercholesteremia     Hyperlipidemia     Hyperthyroidism     managed by Dr. Foreman     OCP (oral contraceptive pills) initiation     Oral allergy syndrome 10/22/2019    Allery to pollen that cross reacts with foods such as raw fruits/veggies and nuts. Oral reaction w/ingestion. Does not progress to anaphylaxis.    NICOLAS (obstructive sleep apnea) 19 HST    AHI 27     Other specified disorders of thyroid     Graves Disease    RAD (reactive airway disease)     Rib contusion     Sinusitis     Wears glasses     Weight gain       Social History:  Social History     Socioeconomic History    Marital status: Single   Occupational History    Occupation: [employer]   Tobacco Use    Smoking status: Former     Packs/day: 0.50     Years: 8.00     Additional pack years: 0.00     Total pack years: 4.00     Types: Cigarettes     Quit date: 2013     Years since quittin.1     Passive exposure: Past    Smokeless tobacco: Never   Vaping Use    Vaping Use: Never used   Substance and Sexual Activity    Alcohol use: Not Currently    Drug use: Never    Sexual activity:  Not Currently   Other Topics Concern    Caffeine Concern Yes    Stress Concern No    Weight Concern No    Special Diet No    Exercise Yes    Seat Belt Yes        REVIEW OF SYSTEMS:   See HPI    EXAM:   Limited due to video visit  GENERAL: does not sound like they are in any acute distress on the video  HEENT: cold sore present with swelling to lip  LUNGS: They are able to phonate clearly without pausing due to sob, there was no coughing during the visit.  NEURO: Oriented times three      LABS:      Lab Results   Component Value Date    WBC 6.1 02/05/2024    RBC 3.89 02/05/2024    HGB 11.6 (L) 02/05/2024    HCT 36.3 02/05/2024    MCV 93.3 02/05/2024    MCH 29.8 02/05/2024    MCHC 32.0 02/05/2024    RDW 12.5 02/05/2024     02/05/2024      Lab Results   Component Value Date    GLU 99 02/05/2024    BUN 20 02/05/2024    BUNCREA SEE NOTE: 02/05/2024    CREATSERUM 0.69 02/05/2024    ANIONGAP 5 07/21/2020     11/16/2017    GFRNAA 95 05/24/2022    GFRAA 110 05/24/2022    CA 9.0 02/05/2024    OSMOCALC 291 07/21/2020    ALKPHO 78 02/05/2024    AST 14 02/05/2024    ALT 15 02/05/2024    BILT 0.4 02/05/2024    TP 6.3 02/05/2024    ALB 3.9 02/05/2024    GLOBULIN 2.4 02/05/2024    AGRATIO 1.6 02/05/2024     02/05/2024    K 4.4 02/05/2024     02/05/2024    CO2 28 02/05/2024      Lab Results   Component Value Date    CHOLEST 157 02/05/2024    TRIG 102 02/05/2024    HDL 60 02/05/2024    LDL 78 02/05/2024    VLDL 26 11/16/2017    TCHDLRATIO 2.6 02/05/2024    NONHDLC 97 02/05/2024      Lab Results   Component Value Date    T4F 1.52 01/25/2022    TSH 0.727 01/25/2022      Lab Results   Component Value Date     11/16/2017    A1C 5.7 (H) 02/05/2024        ASSESSMENT AND PLAN:   1. Cold sore  -The patient was prescribed Valtrex for her cold sore.  Advised to take it as directed, monitor for effectiveness and side effects.  She understands.  - valACYclovir 1 G Oral Tab; Take 2 tablets (2,000 mg total) by  mouth every 12 (twelve) hours for 1 day.  Dispense: 4 tablet; Refill: 0      The patient indicates understanding of these issues and agrees to the plan.  The patient is asked to return as planned or sooner as needed.         Please note that the following visit was completed using two-way, real-time interactive audio and/or video communication.  This has been done in good isaiah to provide continuity of care in the best interest of the provider-patient relationship, due to the ongoing public health crisis/national emergency and because of restrictions of visitation.  There are limitations of this visit as no physical exam could be performed.  Every conscious effort was taken to allow for sufficient and adequate time.  This billing was spent on reviewing labs, medications, radiology tests and decision making.  Appropriate medical decision-making and tests are ordered as detailed in the plan of care above.

## 2024-02-10 DIAGNOSIS — E78.5 HYPERLIPIDEMIA, UNSPECIFIED HYPERLIPIDEMIA TYPE: ICD-10-CM

## 2024-02-12 ENCOUNTER — OFFICE VISIT (OUTPATIENT)
Dept: INTERNAL MEDICINE CLINIC | Facility: CLINIC | Age: 59
End: 2024-02-12
Payer: COMMERCIAL

## 2024-02-12 VITALS
HEART RATE: 76 BPM | BODY MASS INDEX: 35.94 KG/M2 | WEIGHT: 213.13 LBS | SYSTOLIC BLOOD PRESSURE: 110 MMHG | HEIGHT: 64.5 IN | DIASTOLIC BLOOD PRESSURE: 70 MMHG | TEMPERATURE: 99 F | RESPIRATION RATE: 12 BRPM

## 2024-02-12 DIAGNOSIS — D64.9 ANEMIA, UNSPECIFIED TYPE: ICD-10-CM

## 2024-02-12 DIAGNOSIS — E78.5 HYPERLIPIDEMIA, UNSPECIFIED HYPERLIPIDEMIA TYPE: ICD-10-CM

## 2024-02-12 DIAGNOSIS — Z00.00 PHYSICAL EXAM, ANNUAL: Primary | ICD-10-CM

## 2024-02-12 DIAGNOSIS — R73.03 PREDIABETES: ICD-10-CM

## 2024-02-12 RX ORDER — ROSUVASTATIN CALCIUM 5 MG/1
TABLET, COATED ORAL
Qty: 90 TABLET | Refills: 1 | Status: SHIPPED | OUTPATIENT
Start: 2024-02-12

## 2024-02-12 NOTE — PROGRESS NOTES
[facility]    CHIEF COMPLAINT:   Chief Complaint   Patient presents with    Routine Physical     Sees gyne. 1/30/23-pap. 12/19/22-mammo. 7/31/23-colon-repeat 5 years.     Asthma     ACT-23    Immunization/Injection     Declines flu and tetanus shot.          HPI:   Jolie Orellana is a 58 year old female who presents for a complete physical exam. Symptoms: denies discharge, itching, burning or dysuria.     Sees gyne for pap and breast exam. Dr. Carcamo.   Pap done 1/2023, negative with negative hpv.   Mammo done 12/2022. Then had whole breast us in 8/2023 which was negative. Mammo due now. Seeing gyne. She is aware she is due for this.     Colonoscopy done 1/2023, repeat in 5 years. No polyps. history of adenomas.     Due prevnar 20. Has asthma. Declines today. Will get at her local pharmacy.   Due tdap, shingrix, covid booster. Get at local pharmacy.    Exercise: works in a [employer], does a lot of stairs.     Derm: sees dermatology.     Asthma: controlled. Act 23.     Labs reviewed. Hgb borderline low.   Taking meds regularly.     Has left knee pain for a couple of weeks. Worse when she walks. No swelling, no redness. No fall, no trauma.         Wt Readings from Last 6 Encounters:   02/12/24 213 lb 1.6 oz (96.7 kg)   02/07/24 210 lb (95.3 kg)   11/30/23 219 lb (99.3 kg)   10/30/23 218 lb (98.9 kg)   10/30/23 219 lb (99.3 kg)   08/14/23 210 lb 14.4 oz (95.7 kg)     Body mass index is 36.01 kg/m².       Current Outpatient Medications   Medication Sig Dispense Refill    Ascorbic Acid (VITAMIN C OR) Take by mouth daily.      ZINC OR Take by mouth daily.      METFORMIN  MG Oral Tablet 24 Hr TAKE 1 TABLET BY MOUTH EVERY DAY WITH BREAKFAST 90 tablet 0    fluticasone propionate 50 MCG/ACT Nasal Suspension SPRAY 2 SPRAYS INTO EACH NOSTRIL EVERY DAY (Patient taking differently: daily as needed. SPRAY 2 SPRAYS INTO EACH NOSTRIL EVERY DAY) 3 each 1    Omeprazole 40 MG Oral Capsule Delayed Release TAKE 1 CAPSULE BY  MOUTH EVERY DAY 90 capsule 3    rosuvastatin 5 MG Oral Tab TAKE 1 TABLET BY MOUTH EVERY DAY AT NIGHT 90 tablet 1    lisinopril 10 MG Oral Tab TAKE 1 TABLET BY MOUTH EVERY DAY 90 tablet 1    albuterol 108 (90 Base) MCG/ACT Inhalation Aero Soln INHALE 1-2 PUFFS BY MOUTH EVERY 6 HOURS AS NEEDED FOR WHEEZE OR SHORTNESS OF BREATH 6.7 g 1    levothyroxine 125 MCG Oral Tab Take 1 tablet (125 mcg total) by mouth daily. 90 tablet 3    latanoprost 0.005 % Ophthalmic Solution Place 1 drop into both eyes nightly.      B Complex Vitamins (VITAMIN B-COMPLEX OR) Take 1 Dose by mouth daily.      NON FORMULARY Take 1 tablet by mouth daily. Unforgettable's for cognitive health      Calcium 500-125 MG-UNIT Oral Tab Take by mouth.      Vitamin D3 2000 units Oral Cap Take 1 capsule (2,000 Units total) by mouth daily.        Past Medical History:   Diagnosis Date    Allergic rhinitis     Antral gastritis     Asthma     Cervical dysplasia     Condyloma     Diabetes mellitus (HCC)     Eczema     foot    Esophagitis     candida    Eye disease     Fatigue     GERD (gastroesophageal reflux disease)     chronic heartburn    Heartburn     More than 10 years ago?    High cholesterol     Hypercholesteremia     Hyperlipidemia     Hyperthyroidism     managed by Dr. Foreman     OCP (oral contraceptive pills) initiation     Oral allergy syndrome 10/22/2019    Allery to pollen that cross reacts with foods such as raw fruits/veggies and nuts. Oral reaction w/ingestion. Does not progress to anaphylaxis.    NICOLAS (obstructive sleep apnea) 11/19/19 HST    AHI 27     Other specified disorders of thyroid     Graves Disease    RAD (reactive airway disease)     Rib contusion     Sinusitis     Wears glasses     Weight gain       Past Surgical History:   Procedure Laterality Date    COLONOSCOPY      EGD      UPPER GI ENDOSCOPY,EXAM  12/28/2010      Family History   Adopted: Yes   Problem Relation Age of Onset    No Known Problems Father     No Known Problems  Mother     No Known Problems Maternal Grandmother     No Known Problems Maternal Grandfather     No Known Problems Paternal Grandmother     No Known Problems Paternal Grandfather       Social History:   Social History     Socioeconomic History    Marital status: Single   Occupational History    Occupation: asst  [employer]   Tobacco Use    Smoking status: Former     Packs/day: 0.50     Years: 8.00     Additional pack years: 0.00     Total pack years: 4.00     Types: Cigarettes     Quit date: 2013     Years since quittin.1     Passive exposure: Past    Smokeless tobacco: Never   Vaping Use    Vaping Use: Never used   Substance and Sexual Activity    Alcohol use: Not Currently    Drug use: Never    Sexual activity: Not Currently   Other Topics Concern    Caffeine Concern Yes    Stress Concern No    Weight Concern No    Special Diet No    Exercise Yes    Seat Belt Yes     Occ: works at a [employer]. : single.    Exercise: walking.  Diet: watches minimally     REVIEW OF SYSTEMS:   GENERAL: feels well otherwise  SKIN: denies any unusual skin lesions  EYES:denies blurred vision or double vision  HEENT: denies nasal congestion, sinus pain or ST  LUNGS: denies shortness of breath with exertion  CARDIOVASCULAR: denies chest pain on exertion  GI: denies abdominal pain,denies heartburn  : denies dysuria, vaginal discharge or itching  MUSCULOSKELETAL: denies back pain  NEURO: denies headaches  PSYCHE: denies depression or anxiety  HEMATOLOGIC: denies hx of anemia, hgb borderline low on recent labs.   ENDOCRINE: denies thyroid history  ALL/ASTHMA: denies hx of allergy or asthma    EXAM:   /70 (BP Location: Right arm, Patient Position: Sitting, Cuff Size: large)   Pulse 76   Temp 98.6 °F (37 °C) (Temporal)   Resp 12   Ht 5' 4.5\" (1.638 m)   Wt 213 lb 1.6 oz (96.7 kg)   LMP  (LMP Unknown)   Breastfeeding No   BMI 36.01 kg/m²   Body mass index is 36.01 kg/m².   GENERAL: well developed,  well nourished,in no apparent distress  SKIN: no rashes,no suspicious lesions  HEENT: atraumatic, normocephalic,ears and throat are clear  EYES:PERRLA, conjunctiva are clear  NECK: supple,no adenopathy,no bruits  CHEST: no chest tenderness  LUNGS: clear to auscultation  CARDIO: nl s1 and s2, RRR without murmur  GI: good BS's,no masses, HSM or tenderness  BREAST: Deferred. To be done at gyne.    GENITAL/URINARY:  Deferred. To be done at gyne.    MUSCULOSKELETAL: back is not tender,FROM of the back  EXTREMITIES: no cyanosis, clubbing or edema  NEURO: Oriented times three,cranial nerves are intact,motor and sensory are grossly intact    Labs:   Lab Results   Component Value Date/Time    WBC 6.1 02/05/2024 09:32 AM    HGB 11.6 (L) 02/05/2024 09:32 AM     02/05/2024 09:32 AM      Lab Results   Component Value Date/Time    GLU 99 02/05/2024 09:32 AM     02/05/2024 09:32 AM    K 4.4 02/05/2024 09:32 AM     02/05/2024 09:32 AM    CO2 28 02/05/2024 09:32 AM    CREATSERUM 0.69 02/05/2024 09:32 AM    CA 9.0 02/05/2024 09:32 AM    ALB 3.9 02/05/2024 09:32 AM    TP 6.3 02/05/2024 09:32 AM    ALKPHO 78 02/05/2024 09:32 AM    AST 14 02/05/2024 09:32 AM    ALT 15 02/05/2024 09:32 AM    BILT 0.4 02/05/2024 09:32 AM    TSH 0.727 01/25/2022 01:35 PM    T4F 1.52 01/25/2022 01:35 PM        Lab Results   Component Value Date/Time    CHOLEST 157 02/05/2024 09:32 AM    HDL 60 02/05/2024 09:32 AM    TRIG 102 02/05/2024 09:32 AM    LDL 78 02/05/2024 09:32 AM    NONHDLC 97 02/05/2024 09:32 AM       Lab Results   Component Value Date/Time    A1C 5.7 (H) 02/05/2024 09:32 AM      Vitamin D:    Lab Results   Component Value Date    VITD 58 11/28/2022           ASSESSMENT AND PLAN:   Jolie Orellana is a 58 year old female who presents for a complete physical exam.   1. Physical exam, annual  See gyne.   HM discussed.   Immunizations discussed.   Continue regular exercise.   Some venous insufficiency today. Had been on her feet  all weekend. Use support stockings. Keep legs elevated.   Has a cold sore she is treating. Healing slowly     2. Hyperlipidemia, unspecified hyperlipidemia type  Continue statin   - Comp Metabolic Panel (14)  - Lipid Panel    3. Prediabetes  Continue low carb diet.   - Hemoglobin A1C    4. Anemia, unspecified type  Keep up with iron intake in diet. Given instructions.   - CBC With Differential With Platelet    5. Left knee pain  Likely meniscal strain  She wants to do some home exercises first.   If no relief we can do PT. She will let me know.     6. Asthma  ACT 23. AAP reviewed and sent to pt via [facility] today.       Return in about 6 months (around 8/12/2024) for med check.      Carol Ellsworth MD

## 2024-02-12 NOTE — PATIENT INSTRUCTIONS
Borderline anemia: keep up with iron intake in your diet with leafy greens. You can try an iron supplement over the counter every other day - ferrous sulfate 325mg.  Take the iron supplement with vitamin c (half an orange). Avoid dairy for 2 hours before and after the supplements.   We will recheck this in 6 months.     Immunizations due:   Tetanus: tdap  Shingles: shingrix  Pneumonia: prevnar 20

## 2024-03-28 DIAGNOSIS — R73.03 PREDIABETES: ICD-10-CM

## 2024-03-28 RX ORDER — METFORMIN HYDROCHLORIDE 500 MG/1
TABLET, EXTENDED RELEASE ORAL
Qty: 90 TABLET | Refills: 0 | Status: SHIPPED | OUTPATIENT
Start: 2024-03-28

## 2024-03-28 NOTE — TELEPHONE ENCOUNTER
Diabetes Medication Protocol [id number]/28/2024 12:51 AM   Protocol Details Last A1C < 7.5 and within past 6 months    In person appointment or virtual visit in the past 6 mos or appointment in next 3 mos    Microalbumin procedure in past 12 months or taking ACE/ARB    EGFRCR or GFRNAA > 50    GFR in the past 12 months       Future Appointments   Date Time Provider Department Center   8/13/2024 12:00 PM Carol Ellsworth MD [facility] [place]   10/21/2024  1:00 PM Samina Trinh APRN [facility]

## 2024-04-11 DIAGNOSIS — I10 ESSENTIAL HYPERTENSION: ICD-10-CM

## 2024-04-11 RX ORDER — LISINOPRIL 10 MG/1
TABLET ORAL
Qty: 90 TABLET | Refills: 0 | Status: SHIPPED | OUTPATIENT
Start: 2024-04-11

## 2024-04-11 NOTE — TELEPHONE ENCOUNTER
Hypertension Medications Protocol [id number]/11/2024 12:55 AM   Protocol Details CMP or BMP in past 12 months    Last BP reading less than 140/90    In person appointment or virtual visit in the past 12 mos or appointment in next 3 mos    EGFRCR or GFRNAA > 50        Future Appointments   Date Time Provider Department Center   8/13/2024 12:00 PM Carol Ellsworth MD [facility] [place]   10/21/2024  1:00 PM Samina Trinh APRN [facility]            Return in about 6 months (around 8/12/2024) for med check.  . Essential hypertension  Continue meds.     Continue per AVS.    Rx refilled per protocol.

## 2024-04-16 ENCOUNTER — TELEPHONE (OUTPATIENT)
Dept: ORTHOPEDICS CLINIC | Facility: CLINIC | Age: 59
End: 2024-04-16

## 2024-04-16 ENCOUNTER — TELEPHONE (OUTPATIENT)
Dept: INTERNAL MEDICINE CLINIC | Facility: CLINIC | Age: 59
End: 2024-04-16

## 2024-04-16 DIAGNOSIS — G89.29 CHRONIC PAIN OF LEFT KNEE: Primary | ICD-10-CM

## 2024-04-16 DIAGNOSIS — M25.562 LEFT KNEE PAIN, UNSPECIFIED CHRONICITY: ICD-10-CM

## 2024-04-16 DIAGNOSIS — M25.562 CHRONIC PAIN OF LEFT KNEE: Primary | ICD-10-CM

## 2024-04-16 DIAGNOSIS — M25.561 RIGHT KNEE PAIN, UNSPECIFIED CHRONICITY: Primary | ICD-10-CM

## 2024-04-16 NOTE — TELEPHONE ENCOUNTER
XR ordered per ortho protocol. XR scheduled and patient was notified via [employer] to let them know that they should arrive 15-20 minutes early, in order for them to complete imaging.

## 2024-04-16 NOTE — TELEPHONE ENCOUNTER
Patient called for right knee pan. Please advise for xrays   Future Appointments   Date Time Provider Department Center   4/23/2024 10:00 AM Francois Rowe PA-C [facility]   8/13/2024 12:00 PM Carol Ellsworth MD [facility] [place]   10/21/2024  1:00 PM Samina Trinh APRN [facility]

## 2024-04-16 NOTE — TELEPHONE ENCOUNTER
Pt is wanting an MRI order for her left knee or a referral to an Orthopedic Surgeon. She had her annual physical in 2/12/24. She said it hurts so bad she has to wear a brace. She elevates and ices it at home at night. Please call pt back to discuss if she needs an appt first or if you can send a referral.

## 2024-04-23 ENCOUNTER — OFFICE VISIT (OUTPATIENT)
Facility: CLINIC | Age: 59
End: 2024-04-23
Payer: COMMERCIAL

## 2024-04-23 ENCOUNTER — HOSPITAL ENCOUNTER (OUTPATIENT)
Dept: GENERAL RADIOLOGY | Age: 59
Discharge: HOME | End: 2024-04-23
Attending: PHYSICIAN ASSISTANT
Payer: COMMERCIAL

## 2024-04-23 VITALS — WEIGHT: 215 LBS | HEIGHT: 64.5 IN | BODY MASS INDEX: 36.26 KG/M2

## 2024-04-23 DIAGNOSIS — M25.562 LEFT KNEE PAIN, UNSPECIFIED CHRONICITY: ICD-10-CM

## 2024-04-23 DIAGNOSIS — M17.12 PRIMARY OSTEOARTHRITIS OF LEFT KNEE: Primary | ICD-10-CM

## 2024-04-23 PROCEDURE — 99203 OFFICE O/P NEW LOW 30 MIN: CPT | Performed by: PHYSICIAN ASSISTANT

## 2024-04-23 PROCEDURE — 73564 X-RAY EXAM KNEE 4 OR MORE: CPT | Performed by: PHYSICIAN ASSISTANT

## 2024-04-23 NOTE — H&P
[facility] New Patient Note    CC:   Chief Complaint   Patient presents with    Knee Pain     Left knee pain starting about the end of last year  No known injury  Patient uses Elevate,ice, knee brace arthritis pain ointment to help with the pain   Patient states pain is getting better due to using topical ointment        HPI: This 58 year old female presents today with complaints of left knee pain.  She reports an insidious development of medial left knee pain at the beginning of last year.  She denies any clicking, popping, swelling of the left knee.  She reports that she feels improvement with application of Voltaren gel, icing, elevating, and as needed use of Advil.  She denies any prior injections or surgeries to the knee.  No recent physical therapy.  She does work at a [employer] and oftentimes during the job is on her feet for 9 hours straight.    Past Medical History:    Allergic rhinitis    Antral gastritis    Asthma ([facility])    Cervical dysplasia    Condyloma    Diabetes mellitus (HCC)    Eczema    foot    Esophagitis    candida    Eye disease    Fatigue    GERD (gastroesophageal reflux disease)    chronic heartburn    Heartburn    More than 10 years ago?    High cholesterol    Hypercholesteremia    Hyperlipidemia    Hyperthyroidism    managed by Dr. oFreman     OCP (oral contraceptive pills) initiation    Oral allergy syndrome    Allery to pollen that cross reacts with foods such as raw fruits/veggies and nuts. Oral reaction w/ingestion. Does not progress to anaphylaxis.    NICOLAS (obstructive sleep apnea)    AHI 27     Other specified disorders of thyroid    Graves Disease    RAD (reactive airway disease) ([facility])    Rib contusion    Sinusitis    Wears glasses    Weight gain     Past Surgical History:   Procedure Laterality Date    Colonoscopy      Egd      Upper gi endoscopy,exam  12/28/2010     Current Outpatient Medications   Medication Sig Dispense Refill    lisinopril 10 MG Oral Tab TAKE 1 TABLET BY MOUTH  EVERY DAY 90 tablet 0    METFORMIN  MG Oral Tablet 24 Hr TAKE 1 TABLET BY MOUTH EVERY DAY WITH BREAKFAST 90 tablet 0    ROSUVASTATIN 5 MG Oral Tab TAKE 1 TABLET BY MOUTH EVERY DAY AT NIGHT 90 tablet 1    Ascorbic Acid (VITAMIN C OR) Take by mouth daily.      ZINC OR Take by mouth daily.      fluticasone propionate 50 MCG/ACT Nasal Suspension SPRAY 2 SPRAYS INTO EACH NOSTRIL EVERY DAY (Patient taking differently: daily as needed. SPRAY 2 SPRAYS INTO EACH NOSTRIL EVERY DAY) 3 each 1    Omeprazole 40 MG Oral Capsule Delayed Release TAKE 1 CAPSULE BY MOUTH EVERY DAY 90 capsule 3    albuterol 108 (90 Base) MCG/ACT Inhalation Aero Soln INHALE 1-2 PUFFS BY MOUTH EVERY 6 HOURS AS NEEDED FOR WHEEZE OR SHORTNESS OF BREATH 6.7 g 1    levothyroxine 125 MCG Oral Tab Take 1 tablet (125 mcg total) by mouth daily. 90 tablet 3    latanoprost 0.005 % Ophthalmic Solution Place 1 drop into both eyes nightly.      B Complex Vitamins (VITAMIN B-COMPLEX OR) Take 1 Dose by mouth daily.      NON FORMULARY Take 1 tablet by mouth daily. Unforgettable's for cognitive health      Calcium 500-125 MG-UNIT Oral Tab Take by mouth.      Vitamin D3 2000 units Oral Cap Take 1 capsule (2,000 Units total) by mouth daily.       Allergies   Allergen Reactions    Antihistamines, Diphenhydramine-Type UNKNOWN     OTC Antihistimines    Methimazole [Thiamazole] HIVES    Singulair [Montelukast] HIVES, RASH and ITCHING    Sudafed UNKNOWN     Heart palpitations.    Dust Mites RASH and ITCHING     Runny /itchy nose, sneezing , nasal congestion; headaches; post nasal drainage; hoarseness; throat clearing.      Mold RASH and ITCHING     Runny /itchy nose, sneezing , nasal congestion; headaches; post nasal drainage; hoarseness; throat clearing.    Pollen ITCHING     Itchy mouth and throat when eating many fresh vegetables and nuts. Dx Oral Allergy Syndrome on 10/22/19. Recommend daily INCS during problematic seasons - fluticasone 2 sp/n daily. Claritin or  Zyrtec 10mg daily as needed. Albuterol rescue inhaler on hand for as needed.     Family History   Adopted: Yes   Problem Relation Age of Onset    No Known Problems Father     No Known Problems Mother     No Known Problems Maternal Grandmother     No Known Problems Maternal Grandfather     No Known Problems Paternal Grandmother     No Known Problems Paternal Grandfather      Social History     Occupational History    Occupation: asst  [employer]   Tobacco Use    Smoking status: Former     Current packs/day: 0.00     Average packs/day: 0.5 packs/day for 8.0 years (4.0 ttl pk-yrs)     Types: Cigarettes     Start date: 2005     Quit date: 2013     Years since quittin.3     Passive exposure: Past    Smokeless tobacco: Never    Tobacco comments:     Updated 24   Vaping Use    Vaping status: Never Used   Substance and Sexual Activity    Alcohol use: Not Currently    Drug use: Never    Sexual activity: Not Currently        ROS:  Comprehensive system review obtained and negative except as mentioned above    Physical Exam:    Ht 5' 4.5\" (1.638 m)   Wt 215 lb (97.5 kg)   LMP  (LMP Unknown)   BMI 36.33 kg/m²   Constitutional: Awake, alert, no distress.  Very pleasant.  Psychological: Appropriate affect.  Respiratory: Unlabored breathing.  Left lower extremity:  Inspection: skin is intact without any redness or deformity. No appreciable effusion.   Palpation: Tender to palpation along the Pez anserine bursa and medial joint line.  Range of motion: Knee can extend to 0 and flex to approximately 130 degrees.  Knee is stable to valgus and varus stress at 0 and 30 degrees. No laxity with anterior or posterior drawer.   Neuromuscular: Strength is normal and sensation is intact.  Vascular: Extremities are warm and well-perfused.  Lymph: Unremarkable.    Imaging: Imaging was personally viewed, independently interpreted and radiology report read.  Radiographs of the left knee recently obtained  demonstrates minimal medial compartment joint space narrowing with minimal patellofemoral joint space narrowing.    Assessment/Plan:  Diagnoses and all orders for this visit:    Primary osteoarthritis of left knee      Assessment: 58-year-old female with symptomatic radiographically mild left knee osteoarthritis    Plan: I discussed the etiology, natural history, and management options for symptomatic knee osteoarthritis.  I discussed nonsurgical and surgical treatments, with nonsurgical treatments to include anti-inflammatory medications, injections, activity modification, weight loss, low impact exercise and possible therapy.  Surgery would be with knee replacement and is an elective operation reserved for when nonsurgical treatments no longer alleviate symptoms sufficiently.  Since the patient has successfully been treating her symptoms with topical Voltaren, ice, as needed use of oral Advil, I encouraged her to continue with these measures and follow-up with us in clinic for possible injection if interested in the future.  She need only contact our office or MyChart message me.  Her questions were sought and answered satisfactorily.  She is happy with the plan will follow as advised.      Francois Rowe PA-C  [facility]    This note was dictated using Dragon software.  While it was briefly proofread prior to completion, some grammatical, spelling, and word choice errors due to dictation may still occur.

## 2024-05-21 ENCOUNTER — TELEMEDICINE (OUTPATIENT)
Dept: INTERNAL MEDICINE CLINIC | Facility: CLINIC | Age: 59
End: 2024-05-21

## 2024-05-21 DIAGNOSIS — K80.20 CALCULUS OF GALLBLADDER WITHOUT CHOLECYSTITIS WITHOUT OBSTRUCTION: ICD-10-CM

## 2024-05-21 DIAGNOSIS — K57.90 DIVERTICULOSIS: ICD-10-CM

## 2024-05-21 DIAGNOSIS — E78.5 HYPERLIPIDEMIA, UNSPECIFIED HYPERLIPIDEMIA TYPE: ICD-10-CM

## 2024-05-21 DIAGNOSIS — M47.814 SPONDYLOSIS OF THORACIC REGION WITHOUT MYELOPATHY OR RADICULOPATHY: ICD-10-CM

## 2024-05-21 DIAGNOSIS — I25.10 ATHEROSCLEROSIS OF NATIVE CORONARY ARTERY OF NATIVE HEART WITHOUT ANGINA PECTORIS: ICD-10-CM

## 2024-05-21 RX ORDER — PNV NO.95/FERROUS FUM/FOLIC AC 28MG-0.8MG
TABLET ORAL EVERY OTHER DAY
COMMUNITY

## 2024-05-21 RX ORDER — ROSUVASTATIN CALCIUM 10 MG/1
10 TABLET, COATED ORAL NIGHTLY
Qty: 90 TABLET | Refills: 1 | Status: SHIPPED | OUTPATIENT
Start: 2024-05-21

## 2024-05-21 NOTE — PROGRESS NOTES
Virtual Telephone Check-In    Jolie Orellana verbally consents to a Virtual/Telephone Check-In visit on 05/21/24.  Patient has been referred to the [facility] website at [email or web address] to review the yearly Consent to Treat document.    Patient understands and accepts financial responsibility for any deductible, co-insurance and/or co-pays associated with this service.        [facility]    CHIEF COMPLAINT:    Chief Complaint   Patient presents with    Test Results     1/30/23-pap. 11/23/21-mammo. 7/31/23-colon-repeat 5 years.          HISTORY OF PRESENT ILLNESS: Audio and video visit done today due to coronavirus pandemic.     Pt wants to go over abnormal results. Had a ct chest done by pulmonary which showed some incidental findings.     Showed coronary artery atherosclerosis.   She is adopted so does not know her family history so she is worried about this.   She had a heart scan in 2021 with a mild calcium score.   She is already on a statin. Last LDL 78.     Also showed gallstones.   Had been seen on a prior ct abdomen pelvis in 2020 as well.  She has no symptoms.     Showed degenerative changes in her spine and showed an atrophic thyroid gland.     Also showed pulmonary nodules but she will follow up with pulm for this.     She is wondering if she can eat popcorn. Reviewed ct abdomen pelvis in 2020, showed diverticula.     REVIEW OF SYSTEMS:  See HPI    Current Medications:    Current Outpatient Medications   Medication Sig Dispense Refill    Ferrous Sulfate (IRON) 325 (65 Fe) MG Oral Tab Take by mouth every other day.      METAMUCIL FIBER OR Take 5 g by mouth nightly. Takes 3 at night      lisinopril 10 MG Oral Tab TAKE 1 TABLET BY MOUTH EVERY DAY 90 tablet 0    METFORMIN  MG Oral Tablet 24 Hr TAKE 1 TABLET BY MOUTH EVERY DAY WITH BREAKFAST 90 tablet 0    ROSUVASTATIN 5 MG Oral Tab TAKE 1 TABLET BY MOUTH EVERY DAY AT NIGHT 90 tablet 1    Ascorbic Acid (VITAMIN C OR) Take by mouth daily.       ZINC OR Take by mouth daily.      fluticasone propionate 50 MCG/ACT Nasal Suspension SPRAY 2 SPRAYS INTO EACH NOSTRIL EVERY DAY (Patient taking differently: daily as needed. SPRAY 2 SPRAYS INTO EACH NOSTRIL EVERY DAY) 3 each 1    Omeprazole 40 MG Oral Capsule Delayed Release TAKE 1 CAPSULE BY MOUTH EVERY DAY 90 capsule 3    albuterol 108 (90 Base) MCG/ACT Inhalation Aero Soln INHALE 1-2 PUFFS BY MOUTH EVERY 6 HOURS AS NEEDED FOR WHEEZE OR SHORTNESS OF BREATH 6.7 g 1    levothyroxine 125 MCG Oral Tab Take 1 tablet (125 mcg total) by mouth daily. 90 tablet 3    latanoprost 0.005 % Ophthalmic Solution Place 1 drop into both eyes nightly.      B Complex Vitamins (VITAMIN B-COMPLEX OR) Take 1 Dose by mouth daily.      NON FORMULARY Take 1 tablet by mouth daily. Unforgettable's for cognitive health      Calcium 500-125 MG-UNIT Oral Tab Take by mouth.      Vitamin D3 2000 units Oral Cap Take 1 capsule (2,000 Units total) by mouth daily.         PAST MEDICAL, SOCIAL, AND FAMILY HISTORY:  Tobacco:    History   Smoking Status    Former    Types: Cigarettes   Smokeless Tobacco    Never       PHYSICAL EXAM:  LMP  (LMP Unknown)    No physical exam indicated today.     DATA:  Results for orders placed or performed in visit on 08/14/23   CBC With Differential With Platelet   Result Value Ref Range    WHITE BLOOD CELL COUNT 6.1 3.8 - 10.8 Thousand/uL    RED BLOOD CELL COUNT 3.89 3.80 - 5.10 Million/uL    HEMOGLOBIN 11.6 (L) 11.7 - 15.5 g/dL    HEMATOCRIT 36.3 35.0 - 45.0 %    MCV 93.3 80.0 - 100.0 fL    MCH 29.8 27.0 - 33.0 pg    MCHC 32.0 32.0 - 36.0 g/dL    RDW 12.5 11.0 - 15.0 %    PLATELET COUNT 240 140 - 400 Thousand/uL    MPV 11.2 7.5 - 12.5 fL    ABSOLUTE NEUTROPHILS 3,654 1,500 - 7,800 cells/uL    ABSOLUTE LYMPHOCYTES 1,958 850 - 3,900 cells/uL    ABSOLUTE MONOCYTES 366 200 - 950 cells/uL    ABSOLUTE EOSINOPHILS 92 15 - 500 cells/uL    ABSOLUTE BASOPHILS 31 0 - 200 cells/uL    NEUTROPHILS 59.9 %    LYMPHOCYTES 32.1 %     MONOCYTES 6.0 %    EOSINOPHILS 1.5 %    BASOPHILS 0.5 %   Comp Metabolic Panel   Result Value Ref Range    GLUCOSE 99 65 - 99 mg/dL    UREA NITROGEN (BUN) 20 7 - 25 mg/dL    CREATININE 0.69 0.50 - 1.03 mg/dL    EGFR 101 > OR = 60 mL/min/1.73m2    BUN/CREATININE RATIO SEE NOTE: 6 - 22 (calc)    SODIUM 141 135 - 146 mmol/L    POTASSIUM 4.4 3.5 - 5.3 mmol/L    CHLORIDE 107 98 - 110 mmol/L    CARBON DIOXIDE 28 20 - 32 mmol/L    CALCIUM 9.0 8.6 - 10.4 mg/dL    PROTEIN, TOTAL 6.3 6.1 - 8.1 g/dL    ALBUMIN 3.9 3.6 - 5.1 g/dL    GLOBULIN 2.4 1.9 - 3.7 g/dL (calc)    ALBUMIN/GLOBULIN RATIO 1.6 1.0 - 2.5 (calc)    BILIRUBIN, TOTAL 0.4 0.2 - 1.2 mg/dL    ALKALINE PHOSPHATASE 78 37 - 153 U/L    AST 14 10 - 35 U/L    ALT 15 6 - 29 U/L   Lipid Panel   Result Value Ref Range    CHOLESTEROL, TOTAL 157 <200 mg/dL    HDL CHOLESTEROL 60 > OR = 50 mg/dL    TRIGLYCERIDES 102 <150 mg/dL    LDL-CHOLESTEROL 78 mg/dL (calc)    CHOL/HDLC RATIO 2.6 <5.0 (calc)    NON-HDL CHOLESTEROL 97 <130 mg/dL (calc)   Hemoglobin A1C   Result Value Ref Range    HEMOGLOBIN A1c 5.7 (H) <5.7 % of total Hgb            ASSESSMENT AND PLAN:  1. Hyperlipidemia, unspecified hyperlipidemia type  LDL 78. Will target under 70 given atherosclerosis in her coronary arteries and thoracic aorta.   Increase crestor to 10mg at bedtime.   - rosuvastatin 10 MG Oral Tab; Take 1 tablet (10 mg total) by mouth nightly.  Dispense: 90 tablet; Refill: 1    2. Atherosclerosis of native coronary artery of native heart without angina pectoris  Increase crestor to 10mg at bedtime.   Will recheck labs when due.     3. Calculus of gallbladder without cholecystitis without obstruction  Discussed with pt.   She is currently asymptomatic.   Discussed low fat low cholesterol diet, regular exercise, weight loss.   Discussed signs of biliary colic.   Rtc if has symptoms.     4. Spondylosis of thoracic region without myelopathy or radiculopathy  Continue exercise and efforts at weight loss.      5. Diverticulosis  Discussed risk of diverticulitis and preventive strategies.   Okay to eat popcorn in moderation, if has abdominal pain she should rtc for evaluation.         Pt understands phone/video evaluation is not a substitute for face to face examination or emergency care. Pt advised to go to the ER or call 911 for worsening symptoms or acute distress.      Please note that the following visit was completed using two-way, real-time interactive audio and/or video communication.  This has been done in good isaiah to provide continuity of care in the best interest of the provider-patient relationship, due to the ongoing public health crisis/national emergency and because of restrictions of visitation.  There are limitations of this visit as no physical exam could be performed.  Every conscious effort was taken to allow for sufficient and adequate time.  This billing was spent on reviewing labs, medications, radiology tests and decision making.  Appropriate medical decision-making and tests are ordered as detailed in the plan of care above.     No follow-ups on file.      Carol Ellsworth MD

## 2024-06-15 DIAGNOSIS — I10 ESSENTIAL HYPERTENSION: ICD-10-CM

## 2024-06-15 DIAGNOSIS — J45.20 MILD INTERMITTENT ASTHMA WITHOUT COMPLICATION (HCC): ICD-10-CM

## 2024-06-15 DIAGNOSIS — R73.03 PREDIABETES: ICD-10-CM

## 2024-06-17 ENCOUNTER — OFFICE VISIT (OUTPATIENT)
Facility: CLINIC | Age: 59
End: 2024-06-17
Payer: COMMERCIAL

## 2024-06-17 VITALS — BODY MASS INDEX: 34.91 KG/M2 | WEIGHT: 207 LBS | HEIGHT: 64.5 IN

## 2024-06-17 DIAGNOSIS — M17.12 PRIMARY OSTEOARTHRITIS OF LEFT KNEE: Primary | ICD-10-CM

## 2024-06-17 RX ORDER — TRIAMCINOLONE ACETONIDE 40 MG/ML
40 INJECTION, SUSPENSION INTRA-ARTICULAR; INTRAMUSCULAR ONCE
Status: COMPLETED | OUTPATIENT
Start: 2024-06-17 | End: 2024-06-17

## 2024-06-17 RX ADMIN — TRIAMCINOLONE ACETONIDE 40 MG: 40 INJECTION, SUSPENSION INTRA-ARTICULAR; INTRAMUSCULAR at 11:02:00

## 2024-06-17 NOTE — PROCEDURES
Risks and benefits of knee injection discussed with the patient, with risks including but not limited to pain and swelling at the injection site and/or within the knee joint, infection, elevation in blood pressure and/or glucose levels, facial flushing. After informed consent, the patient's left knee was marked, locally anesthetized with skin refrigerant, prepped with topical antiseptic, and injected with a mixture of 1mL 40mg/mL Kenalog, 2mL 1% lidocaine and 2mL 0.5% marcaine through the inferolateral portal.  A band-aid was applied.  The patient tolerated the procedure well.    Francois Rowe PA-C  [facility]

## 2024-06-18 RX ORDER — ALBUTEROL SULFATE 90 UG/1
INHALANT RESPIRATORY (INHALATION)
Qty: 6.7 G | Refills: 1 | Status: SHIPPED | OUTPATIENT
Start: 2024-06-18

## 2024-06-18 RX ORDER — LISINOPRIL 10 MG/1
TABLET ORAL
Qty: 90 TABLET | Refills: 0 | OUTPATIENT
Start: 2024-06-18

## 2024-06-18 RX ORDER — METFORMIN HCL 500 MG
TABLET, EXTENDED RELEASE 24 HR ORAL
Qty: 90 TABLET | Refills: 0 | Status: SHIPPED | OUTPATIENT
Start: 2024-06-18 | End: 2024-08-13

## 2024-06-18 NOTE — TELEPHONE ENCOUNTER
Last OV relevant to medication: 2/12/24   Last refill date: 9/27/23     #/refills: 6.7 g/ 1 refill   When pt was asked to return for OV:   Return in about 6 months (around 8/12/2024) for med check.      Upcoming appt/reason:   Future Appointments   Date Time Provider Department Center   8/13/2024 12:00 PM Carol Ellsworth MD [facility] [place]   10/21/2024  1:00 PM Samina Trinh APRN [facility]       Was pt informed of any over due labs: appears utd      6. Asthma  ACT 23. AAP reviewed and sent to pt via [facility] [place].     Asthma & COPD Medication Protocol [id number]/15/2024 12:08 PM   Protocol Details Asthma Action Score greater than or equal to 20    Appointment in past 6 or next 3 months    AAP/ACT given in last 12 months     Rx refilled per protocol.

## 2024-06-18 NOTE — TELEPHONE ENCOUNTER
Diabetes Medication Protocol [id number]/15/2024 12:08 PM   Protocol Details Last A1C < 7.5 and within past 6 months    In person appointment or virtual visit in the past 6 mos or appointment in next 3 mos    Microalbumin procedure in past 12 months or taking ACE/ARB    EGFRCR or GFRNAA > 50    GFR in the past 12 months        Lisinopril denied. Refilled 90/0 on 4/11.       3. Prediabetes  Continue low carb diet.   - Hemoglobin A1C    Return in about 6 months (around 8/12/2024) for med check. \    Future Appointments   Date Time Provider Department Center   8/13/2024 12:00 PM Carol Ellsworth MD [facility] [place]   10/21/2024  1:00 PM Samina Trinh APRN [facility]      Metformin refilled per protocol.

## 2024-06-22 ENCOUNTER — TELEMEDICINE (OUTPATIENT)
Dept: INTERNAL MEDICINE CLINIC | Facility: CLINIC | Age: 59
End: 2024-06-22

## 2024-06-22 DIAGNOSIS — J45.20 MILD INTERMITTENT ASTHMA WITHOUT COMPLICATION (HCC): ICD-10-CM

## 2024-06-22 DIAGNOSIS — J06.9 VIRAL URI WITH COUGH: Primary | ICD-10-CM

## 2024-06-22 RX ORDER — PREDNISONE 20 MG/1
40 TABLET ORAL DAILY
Qty: 10 TABLET | Refills: 0 | Status: SHIPPED | OUTPATIENT
Start: 2024-06-22 | End: 2024-06-27

## 2024-06-22 RX ORDER — BENZONATATE 200 MG/1
200 CAPSULE ORAL 2 TIMES DAILY PRN
Qty: 30 CAPSULE | Refills: 0 | Status: SHIPPED | OUTPATIENT
Start: 2024-06-22

## 2024-06-22 NOTE — PROGRESS NOTES
[facility]    Virtual Video Check-In    Jolie Orellana verbally consents to a Virtual/video Check-In visit on 06/22/24. Patient has been referred to the [facility] website at [email or web address] to review the yearly Consent to Treat document.    Patient understands and accepts financial responsibility for any deductible, co-insurance and/or co-pays associated with this service.    CHIEF COMPLAINT     Chief Complaint   Patient presents with    Viral Syndrome     Started on 6/18/24 - Sore Throat (subsided mostly now), Sinus Congestion/Drainage, Cough - Dry/Wet - some mucus comes up at times, Tickle in throat before coughing fit, Fatigue    Pt did mention vomiting when asked but stated it was due to her Breathing Treatment she was doing at the time       HPI:   Jolie Orellana is a 58 year old female with complaints of URI symptoms that started on Tuesday. Started with a sore throat which has now subsided. She is having nasal congestion, runny nose, and cough with phlegm at times. Denies fevers, chills, SOB, CP, fatigue, loss of taste/smell, nausea, vomiting, or diarrhea. She does have asthma and uses rescue inhaler as needed, has not needed to use more frequently yet. No wheezing or chest tightness. She is going on vacation on 7/1.     Current Outpatient Medications   Medication Sig Dispense Refill    predniSONE 20 MG Oral Tab Take 2 tablets (40 mg total) by mouth daily for 5 days. 10 tablet 0    benzonatate 200 MG Oral Cap Take 1 capsule (200 mg total) by mouth 2 (two) times daily as needed. 30 capsule 0    albuterol 108 (90 Base) MCG/ACT Inhalation Aero Soln INHALE 1-2 PUFFS BY MOUTH EVERY 6 HOURS AS NEEDED FOR WHEEZE OR SHORTNESS OF BREATH 6.7 g 1    METFORMIN  MG Oral Tablet 24 Hr TAKE 1 TABLET BY MOUTH EVERY DAY WITH BREAKFAST 90 tablet 0    Ferrous Sulfate (IRON) 325 (65 Fe) MG Oral Tab Take by mouth every other day.      METAMUCIL FIBER OR Take 5 g by mouth nightly. Takes 3 at night       rosuvastatin 10 MG Oral Tab Take 1 tablet (10 mg total) by mouth nightly. 90 tablet 1    lisinopril 10 MG Oral Tab TAKE 1 TABLET BY MOUTH EVERY DAY 90 tablet 0    Ascorbic Acid (VITAMIN C OR) Take by mouth daily.      ZINC OR Take by mouth daily.      fluticasone propionate 50 MCG/ACT Nasal Suspension SPRAY 2 SPRAYS INTO EACH NOSTRIL EVERY DAY (Patient taking differently: daily as needed. SPRAY 2 SPRAYS INTO EACH NOSTRIL EVERY DAY) 3 each 1    Omeprazole 40 MG Oral Capsule Delayed Release TAKE 1 CAPSULE BY MOUTH EVERY DAY 90 capsule 3    levothyroxine 125 MCG Oral Tab Take 1 tablet (125 mcg total) by mouth daily. 90 tablet 3    latanoprost 0.005 % Ophthalmic Solution Place 1 drop into both eyes nightly.      B Complex Vitamins (VITAMIN B-COMPLEX OR) Take 1 Dose by mouth daily.      NON FORMULARY Take 1 tablet by mouth daily. Unforgettable's for cognitive health      Calcium 500-125 MG-UNIT Oral Tab Take by mouth.      Vitamin D3 2000 units Oral Cap Take 1 capsule (2,000 Units total) by mouth daily.        Past Medical History:    Allergic rhinitis    Antral gastritis    Asthma (HCC)    Cervical dysplasia    Condyloma    Diabetes mellitus (HCC)    Eczema    foot    Esophagitis    candida    Eye disease    Fatigue    GERD (gastroesophageal reflux disease)    chronic heartburn    Heartburn    More than 10 years ago?    High cholesterol    Hypercholesteremia    Hyperlipidemia    Hyperthyroidism    managed by Dr. Foreman     OCP (oral contraceptive pills) initiation    Oral allergy syndrome    Allery to pollen that cross reacts with foods such as raw fruits/veggies and nuts. Oral reaction w/ingestion. Does not progress to anaphylaxis.    NICOLAS (obstructive sleep apnea)    AHI 27     Other specified disorders of thyroid    Graves Disease    RAD (reactive airway disease) (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[employer]   Tobacco Use    Smoking status: Former     Current packs/day: 0.00     Average packs/day: 0.5 packs/day for 8.0 years (4.0 ttl pk-yrs)     Types: Cigarettes     Start date: 2005     Quit date: 2013     Years since quittin.4     Passive exposure: Past    Smokeless tobacco: Never    Tobacco comments:     Updated 24   Vaping Use    Vaping status: Never Used   Substance and Sexual Activity    Alcohol use: Not Currently    Drug use: Never    Sexual activity: Not Currently   Other Topics Concern    Caffeine Concern Yes    Stress Concern No    Weight Concern No    Special Diet No    Exercise Yes    Seat Belt Yes        REVIEW OF SYSTEMS:   See HPI.    EXAM:   Limited due to COVID-19  GENERAL: Appears stated age and in no apparent distress. Alert and oriented x3.   LUNGS: Patient speaks clearly in full sentences without dyspnea, no cough while on video visit.   PSYCH: Appropriate mood and affect.    LABS:      Lab Results   Component Value Date    WBC 6.1 2024    RBC 3.89 2024    HGB 11.6 (L) 2024    HCT 36.3 2024    MCV 93.3 2024    MCH 29.8 2024    MCHC 32.0 2024    RDW 12.5 2024     2024      Lab Results   Component Value Date    GLU 99 2024    BUN 20 2024    BUNCREA SEE NOTE: 2024    CREATSERUM 0.69 2024    ANIONGAP 5 2020     2017    GFRNAA 95 2022    GFRAA 110 2022    CA 9.0 2024    OSMOCALC 291 2020    ALKPHO 78 2024    AST 14 2024    ALT 15 2024    BILT 0.4 2024    TP 6.3 2024    ALB 3.9 2024    GLOBULIN 2.4 2024    AGRATIO 1.6 2024     2024    K 4.4 2024     2024    CO2 28 2024      Lab Results   Component Value Date    CHOLEST 157 2024    TRIG 102 2024    HDL 60 2024    LDL 78 2024     VLDL 26 11/16/2017    TCHDLRATIO 2.6 02/05/2024    NONHDLC 97 02/05/2024      Lab Results   Component Value Date    T4F 1.52 01/25/2022    TSH 0.727 01/25/2022      Lab Results   Component Value Date     11/16/2017    A1C 5.7 (H) 02/05/2024        IMAGING:     No results found.     ASSESSMENT AND PLAN:      1. Viral URI with cough  2. Mild intermittent asthma without complication (HCC)  -Start course of prednisone, SE discussed  -Use benzonatate perles prn  -Has cheratussin AC, advised to use at bedtime prn, cautioned regarding dizziness  -Use albuterol inhaler PRN  -follow up in office if symptoms worsen/do not improve. Will need imaging then  -Go to ER for severe shortness of breath or chest pain.   - predniSONE 20 MG Oral Tab; Take 2 tablets (40 mg total) by mouth daily for 5 days.  Dispense: 10 tablet; Refill: 0  - benzonatate 200 MG Oral Cap; Take 1 capsule (200 mg total) by mouth 2 (two) times daily as needed.  Dispense: 30 capsule; Refill: 0    The patient indicates understanding of these issues and agrees to the plan.    The patient is asked to return as needed or when routine care is due.    MELISSA Monge  6/22/2024    Patient understands phone/video evaluation is not a substitute for face-to-face examination or emergency care. Patient advised to go to the ER or call 911 for worsening symptoms or acute distress. The patient was also advised of the potential privacy & security concerns related to the telehealth platform.     Lastly, the patient confirmed that they were in [place].     Please note that the following visit was completed using two-way, real-time interactive audio and video communication. This has been done in good isaiah to provide continuity of care in the best interest of the provider-patient relationship, due to the ongoing public health crisis/national emergency and because of restrictions of visitation. There are limitations of this visit as no physical exam could be performed.  Every conscious effort was taken to allow for sufficient and adequate time. This billing was spent on reviewing labs, medications, radiology tests and decision making. Appropriate medical decision-making and tests are ordered as detailed in the plan of care above.

## 2024-06-26 DIAGNOSIS — J45.21 MILD INTERMITTENT ASTHMA WITH ACUTE EXACERBATION (HCC): ICD-10-CM

## 2024-06-26 RX ORDER — ALBUTEROL SULFATE 2.5 MG/3ML
2.5 SOLUTION RESPIRATORY (INHALATION) EVERY 6 HOURS PRN
Qty: 150 ML | Refills: 0 | Status: SHIPPED | OUTPATIENT
Start: 2024-06-26

## 2024-07-30 LAB
ABSOLUTE BASOPHILS: 28 CELLS/UL (ref 0–200)
ABSOLUTE EOSINOPHILS: 69 CELLS/UL (ref 15–500)
ABSOLUTE LYMPHOCYTES: 2312 CELLS/UL (ref 850–3900)
ABSOLUTE MONOCYTES: 338 CELLS/UL (ref 200–950)
ABSOLUTE NEUTROPHILS: 4154 CELLS/UL (ref 1500–7800)
ALBUMIN/GLOBULIN RATIO: 1.4 (CALC) (ref 1–2.5)
ALBUMIN: 3.8 G/DL (ref 3.6–5.1)
ALKALINE PHOSPHATASE: 83 U/L (ref 37–153)
ALT: 10 U/L (ref 6–29)
AST: 16 U/L (ref 10–35)
BASOPHILS: 0.4 %
BILIRUBIN, TOTAL: 0.5 MG/DL (ref 0.2–1.2)
BUN: 16 MG/DL (ref 7–25)
CALCIUM: 9.1 MG/DL (ref 8.6–10.4)
CARBON DIOXIDE: 26 MMOL/L (ref 20–32)
CHLORIDE: 106 MMOL/L (ref 98–110)
CHOL/HDLC RATIO: 2.5 (CALC)
CHOLESTEROL, TOTAL: 153 MG/DL
CREATININE: 0.65 MG/DL (ref 0.5–1.03)
EGFR: 102 ML/MIN/1.73M2
EOSINOPHILS: 1 %
GLOBULIN: 2.7 G/DL (CALC) (ref 1.9–3.7)
GLUCOSE: 94 MG/DL (ref 65–99)
HDL CHOLESTEROL: 62 MG/DL
HEMATOCRIT: 37.8 % (ref 35–45)
HEMOGLOBIN A1C: 5.9 % OF TOTAL HGB
HEMOGLOBIN: 12.1 G/DL (ref 11.7–15.5)
LDL-CHOLESTEROL: 70 MG/DL (CALC)
LYMPHOCYTES: 33.5 %
MCH: 31.2 PG (ref 27–33)
MCHC: 32 G/DL (ref 32–36)
MCV: 97.4 FL (ref 80–100)
MONOCYTES: 4.9 %
MPV: 11.2 FL (ref 7.5–12.5)
NEUTROPHILS: 60.2 %
NON-HDL CHOLESTEROL: 91 MG/DL (CALC)
PLATELET COUNT: 259 THOUSAND/UL (ref 140–400)
POTASSIUM: 4.7 MMOL/L (ref 3.5–5.3)
PROTEIN, TOTAL: 6.5 G/DL (ref 6.1–8.1)
RDW: 12.8 % (ref 11–15)
RED BLOOD CELL COUNT: 3.88 MILLION/UL (ref 3.8–5.1)
SODIUM: 142 MMOL/L (ref 135–146)
TRIGLYCERIDES: 130 MG/DL
WHITE BLOOD CELL COUNT: 6.9 THOUSAND/UL (ref 3.8–10.8)

## 2024-08-13 ENCOUNTER — HOSPITAL ENCOUNTER (OUTPATIENT)
Dept: MAMMOGRAPHY | Age: 59
Discharge: HOME | End: 2024-08-13
Attending: NURSE PRACTITIONER
Payer: COMMERCIAL

## 2024-08-13 ENCOUNTER — OFFICE VISIT (OUTPATIENT)
Dept: INTERNAL MEDICINE CLINIC | Facility: CLINIC | Age: 59
End: 2024-08-13
Payer: COMMERCIAL

## 2024-08-13 VITALS
TEMPERATURE: 97 F | OXYGEN SATURATION: 97 % | HEIGHT: 64.5 IN | BODY MASS INDEX: 35.18 KG/M2 | HEART RATE: 64 BPM | DIASTOLIC BLOOD PRESSURE: 64 MMHG | SYSTOLIC BLOOD PRESSURE: 118 MMHG | RESPIRATION RATE: 16 BRPM | WEIGHT: 208.63 LBS

## 2024-08-13 DIAGNOSIS — K21.9 GASTROESOPHAGEAL REFLUX DISEASE, UNSPECIFIED WHETHER ESOPHAGITIS PRESENT: ICD-10-CM

## 2024-08-13 DIAGNOSIS — G47.33 OSA ON CPAP: ICD-10-CM

## 2024-08-13 DIAGNOSIS — Z12.31 ENCOUNTER FOR SCREENING MAMMOGRAM FOR BREAST CANCER: ICD-10-CM

## 2024-08-13 DIAGNOSIS — R73.03 PREDIABETES: ICD-10-CM

## 2024-08-13 DIAGNOSIS — I10 ESSENTIAL HYPERTENSION: Primary | ICD-10-CM

## 2024-08-13 DIAGNOSIS — Z00.00 ENCOUNTER FOR ANNUAL PHYSICAL EXAM: ICD-10-CM

## 2024-08-13 DIAGNOSIS — E89.0 POSTABLATIVE HYPOTHYROIDISM: ICD-10-CM

## 2024-08-13 DIAGNOSIS — I25.10 CORONARY ARTERY DISEASE INVOLVING NATIVE CORONARY ARTERY OF NATIVE HEART WITHOUT ANGINA PECTORIS: ICD-10-CM

## 2024-08-13 DIAGNOSIS — J45.20 MILD INTERMITTENT ASTHMA WITHOUT COMPLICATION (HCC): ICD-10-CM

## 2024-08-13 DIAGNOSIS — E66.9 OBESITY (BMI 30-39.9): ICD-10-CM

## 2024-08-13 DIAGNOSIS — E78.5 HYPERLIPIDEMIA, UNSPECIFIED HYPERLIPIDEMIA TYPE: ICD-10-CM

## 2024-08-13 PROCEDURE — 77067 SCR MAMMO BI INCL CAD: CPT | Performed by: NURSE PRACTITIONER

## 2024-08-13 PROCEDURE — 77063 BREAST TOMOSYNTHESIS BI: CPT | Performed by: NURSE PRACTITIONER

## 2024-08-13 PROCEDURE — 99214 OFFICE O/P EST MOD 30 MIN: CPT | Performed by: NURSE PRACTITIONER

## 2024-08-13 PROCEDURE — G2211 COMPLEX E/M VISIT ADD ON: HCPCS | Performed by: NURSE PRACTITIONER

## 2024-08-13 RX ORDER — LISINOPRIL 10 MG/1
10 TABLET ORAL DAILY
Qty: 90 TABLET | Refills: 1 | Status: SHIPPED | OUTPATIENT
Start: 2024-08-13

## 2024-08-13 RX ORDER — METFORMIN HYDROCHLORIDE 500 MG/1
500 TABLET, EXTENDED RELEASE ORAL
Qty: 90 TABLET | Refills: 1 | Status: SHIPPED | OUTPATIENT
Start: 2024-08-13

## 2024-08-13 NOTE — PROGRESS NOTES
CHIEF COMPLAINT:     Chief Complaint   Patient presents with    Medication Follow-Up    Test Results     [place] - CT Scan, has already discussed with Dr. Ellsworth but wants to discuss more today about it       HPI:   Jolie Orellana is a 58 year old female coming in for med check. Labs reviewed and discussed with patient.    HTN: Patient takes the BP medications as directed, no reported side effects. Denies chest pain, SOB, palpitations, headaches, visual disturbances. Patient is on low salt diet, non-smoker.    Hyperlipidemia: On statin, tolerating well, reports no SE. No muscle aches.     Asthma: stable, uses albuterol inhaler prn.     Hypothyroidism: On levothyroxine. No SE and no symptoms of hyperthyroidism (weight loss, diarrhea, anxiety, palpitations) or hypothyroidism (weight gain, depression, constipation, bradycardia) at this time. Sees veronica for this.    Prediabetes/obesity: On metformin. Working on diet and exercise.    GERD: On daily PPI. Sees GI.    NICOLAS: On cpap.    Overdue for mammogram, previous order . Has not seen gyne in a while.     Past Medical History:    Allergic rhinitis    Antral gastritis    Asthma (HCC)    Cervical dysplasia    Condyloma    Diabetes mellitus (HCC)    Eczema    foot    Esophagitis    candida    Eye disease    Fatigue    GERD (gastroesophageal reflux disease)    chronic heartburn    Heartburn    More than 10 years ago?    High cholesterol    Hypercholesteremia    Hyperlipidemia    Hyperthyroidism    managed by Dr. Foreman     OCP (oral contraceptive pills) initiation    Oral allergy syndrome    Allery to pollen that cross reacts with foods such as raw fruits/veggies and nuts. Oral reaction w/ingestion. Does not progress to anaphylaxis.    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[employer]   Tobacco Use    Smoking status: Former     Current packs/day: 0.00     Average packs/day: 0.5 packs/day for 8.0 years (4.0 ttl pk-yrs)     Types: Cigarettes     Start date: 2005     Quit date: 2013     Years since quittin.6     Passive exposure: Past    Smokeless tobacco: Never    Tobacco comments:     Updated 24   Vaping Use    Vaping status: Never Used   Substance and Sexual Activity    Alcohol use: Not Currently    Drug use: Never    Sexual activity: Not Currently   Other Topics Concern    Caffeine Concern Yes    Stress Concern No    Weight Concern No    Special Diet No    Exercise Yes    Seat Belt Yes      Family History:  Family History   Adopted: Yes   Problem Relation Age of Onset    No Known Problems Father     No Known Problems Mother     No Known Problems Maternal Grandmother     No Known Problems Maternal Grandfather     No Known Problems Paternal Grandmother     No Known Problems Paternal Grandfather       Allergies:  Allergies   Allergen Reactions    Antihistamines, Diphenhydramine-Type UNKNOWN     OTC Antihistimines    Methimazole [Thiamazole] HIVES    Singulair [Montelukast] HIVES, RASH and ITCHING    Sudafed UNKNOWN     Heart palpitations.    Dust Mites RASH and ITCHING     Runny /itchy nose, sneezing , nasal congestion; headaches; post nasal drainage; hoarseness; throat clearing.      Mold RASH and ITCHING     Runny /itchy nose, sneezing , nasal congestion; headaches; post nasal drainage; hoarseness; throat clearing.    Pollen ITCHING     Itchy mouth and throat when eating many fresh vegetables and nuts. Dx Oral Allergy Syndrome on 10/22/19. Recommend daily INCS during problematic seasons - fluticasone 2 sp/n daily. Claritin or Zyrtec 10mg daily  as needed. Albuterol rescue inhaler on hand for as needed.      Current Meds:  Current Outpatient Medications   Medication Sig Dispense Refill    ALBUTEROL (2.5 MG/3ML) 0.083% Inhalation Nebu Soln INHALE 3 ML BY NEBULIZATION EVERY 6 HOURS AS NEEDED FOR WHEEZING 150 mL 0    albuterol 108 (90 Base) MCG/ACT Inhalation Aero Soln INHALE 1-2 PUFFS BY MOUTH EVERY 6 HOURS AS NEEDED FOR WHEEZE OR SHORTNESS OF BREATH 6.7 g 1    METFORMIN  MG Oral Tablet 24 Hr TAKE 1 TABLET BY MOUTH EVERY DAY WITH BREAKFAST 90 tablet 0    Ferrous Sulfate (IRON) 325 (65 Fe) MG Oral Tab Take by mouth every other day.      METAMUCIL FIBER OR Take 5 g by mouth nightly. Takes 3 at night      rosuvastatin 10 MG Oral Tab Take 1 tablet (10 mg total) by mouth nightly. 90 tablet 1    lisinopril 10 MG Oral Tab TAKE 1 TABLET BY MOUTH EVERY DAY 90 tablet 0    Ascorbic Acid (VITAMIN C OR) Take by mouth daily.      ZINC OR Take by mouth daily.      fluticasone propionate 50 MCG/ACT Nasal Suspension SPRAY 2 SPRAYS INTO EACH NOSTRIL EVERY DAY (Patient taking differently: daily as needed. SPRAY 2 SPRAYS INTO EACH NOSTRIL EVERY DAY) 3 each 1    Omeprazole 40 MG Oral Capsule Delayed Release TAKE 1 CAPSULE BY MOUTH EVERY DAY 90 capsule 3    levothyroxine 125 MCG Oral Tab Take 1 tablet (125 mcg total) by mouth daily. 90 tablet 3    latanoprost 0.005 % Ophthalmic Solution Place 1 drop into both eyes nightly.      B Complex Vitamins (VITAMIN B-COMPLEX OR) Take 1 Dose by mouth daily.      NON FORMULARY Take 1 tablet by mouth daily. Unforgettable's for cognitive health      Calcium 500-125 MG-UNIT Oral Tab Take by mouth.      Vitamin D3 2000 units Oral Cap Take 1 capsule (2,000 Units total) by mouth daily.         Counseling given: Not Answered  Tobacco comments: Updated 4/23/24       REVIEW OF SYSTEMS:   See HPI    EXAM:     /64 (BP Location: Left arm, Patient Position: Sitting, Cuff Size: large)   Pulse 64   Temp 97.2 °F (36.2 °C) (Temporal)   Resp 16    Ht 5' 4.5\" (1.638 m)   Wt 208 lb 9.6 oz (94.6 kg)   LMP  (LMP Unknown)   SpO2 97%   BMI 35.25 kg/m²   Body mass index is 35.25 kg/m².   Vital signs reviewed. Appears stated age, well groomed, in no acute distress.  Physical Exam:  GENERAL: Patient is alert, awake and oriented, well developed, well nourished.  HEENT: Head: Normocephalic, atraumatic.   HEART: RRR without murmur.  LUNGS: Clear to auscultation bilaterally, no rales/rhonchi/wheezing.  ABDOMEN: good BS's, no masses, HSM or tenderness  MUSCULOSKELETAL: No obvious joint deformity or swelling.   EXTREMITIES: No edema, no cyanosis, no clubbing, FROM  NEURO: Oriented time three.     LABS:      Lab Results   Component Value Date    WBC 6.9 07/29/2024    RBC 3.88 07/29/2024    HGB 12.1 07/29/2024    HCT 37.8 07/29/2024    MCV 97.4 07/29/2024    MCH 31.2 07/29/2024    MCHC 32.0 07/29/2024    RDW 12.8 07/29/2024     07/29/2024      Lab Results   Component Value Date    GLU 94 07/29/2024    BUN 16 07/29/2024    BUNCREA SEE NOTE: 07/29/2024    CREATSERUM 0.65 07/29/2024    ANIONGAP 5 07/21/2020     11/16/2017    GFRNAA 95 05/24/2022    GFRAA 110 05/24/2022    CA 9.1 07/29/2024    OSMOCALC 291 07/21/2020    ALKPHO 83 07/29/2024    AST 16 07/29/2024    ALT 10 07/29/2024    BILT 0.5 07/29/2024    TP 6.5 07/29/2024    ALB 3.8 07/29/2024    GLOBULIN 2.7 07/29/2024    AGRATIO 1.4 07/29/2024     07/29/2024    K 4.7 07/29/2024     07/29/2024    CO2 26 07/29/2024      Lab Results   Component Value Date    CHOLEST 153 07/29/2024    TRIG 130 07/29/2024    HDL 62 07/29/2024    LDL 70 07/29/2024    VLDL 26 11/16/2017    TCHDLRATIO 2.5 07/29/2024    NONHDLC 91 07/29/2024      Lab Results   Component Value Date    T4F 1.52 01/25/2022    TSH 0.727 01/25/2022      Lab Results   Component Value Date     11/16/2017    A1C 5.9 (H) 07/29/2024        IMAGING:     No results found.     ASSESSMENT AND PLAN:   1. Essential hypertension  -BP stable,  CPM    2. Hyperlipidemia, unspecified hyperlipidemia type  -LDL at goal. Continue statin  - LIPID PANEL [7600] [Q]; Future    3. Coronary artery disease involving native coronary artery of native heart without angina pectoris  -Continue statin    4. Mild intermittent asthma without complication (HCC)  -Stable, CPM    5. Postablative hypothyroidism  -Euthyroid clinically and chemically  -Levothyroxine managed by endo    6. Prediabetes  7. Obesity (BMI 30-39.9)  -Continue metformin, low carb diet and regular exercise  - HGB A1C [496] [Q]; Future    8. Gastroesophageal reflux disease, unspecified whether esophagitis present  -On daily PPI, sees GI    9. NICOLAS on CPAP  -Continue cpap    10. Encounter for screening mammogram for breast cancer  -Do mammogram  - [facility] JENNIFER 2D+3D SCREENING BILAT (CPT=77067/45821); Future    11. Encounter for annual physical exam  -Do fasting labs prior to annual PE  - CBC [6399] [Q]; Future  - COMP METABOLIC PANEL [68142] [Q]; Future  - LIPID PANEL [7600] [Q]; Future     The patient indicates understanding of these issues and agrees to the plan.  Return in about 6 months (around 2/13/2025) for physical.    MELISSA Monge  8/13/2024

## 2024-08-16 ENCOUNTER — PATIENT MESSAGE (OUTPATIENT)
Dept: INTERNAL MEDICINE CLINIC | Facility: CLINIC | Age: 59
End: 2024-08-16

## 2024-08-16 NOTE — TELEPHONE ENCOUNTER
If not covered by insurance, the US breast is probably cheapest but she should check with insurance regarding prices for either US breast vs MRI. Thanks.   23-Aug-2021 17:01

## 2024-08-16 NOTE — TELEPHONE ENCOUNTER
From: Jolie Orellana  To: Shannon Lerma  Sent: 8/16/2024 10:57 AM CDT  Subject: Mammogram additional Testing    Graham Lerma,    I talked to my insurance per your recommendation. I have met my deductible but the 3 options that was suggested is not preventative. They will cover 70% and I am responsible for 30%.  I need your opinion do you think I should do one of these tests? If so which one would you recommend. How can I find out prices? Which one is cheaper.    Thank you!

## 2024-08-20 DIAGNOSIS — E78.5 HYPERLIPIDEMIA, UNSPECIFIED HYPERLIPIDEMIA TYPE: ICD-10-CM

## 2024-08-22 RX ORDER — ROSUVASTATIN CALCIUM 10 MG/1
10 TABLET, COATED ORAL NIGHTLY
Qty: 90 TABLET | Refills: 1 | Status: SHIPPED | OUTPATIENT
Start: 2024-08-22

## 2024-08-22 NOTE — TELEPHONE ENCOUNTER
Cholesterol Medication Protocol [id number]/20/2024 11:35 AM   Protocol Details ALT < 80    ALT resulted within past year    Lipid panel within past 12 months    In person appointment or virtual visit in the past 12 mos or appointment in next 3 mos          Future Appointments   Date Time Provider Department Center   10/21/2024  1:00 PM Samina Trinh APRN [facility]   10/21/2024  3:00 PM Rhea Carcamo MD [facility] [address]   2/18/2025 10:40 AM Carol Ellsworth MD [facility] [address] [facility] [address]

## 2024-10-10 DIAGNOSIS — J45.21 MILD INTERMITTENT ASTHMA WITH ACUTE EXACERBATION (HCC): ICD-10-CM

## 2024-10-14 ENCOUNTER — TELEMEDICINE (OUTPATIENT)
Dept: INTERNAL MEDICINE CLINIC | Facility: CLINIC | Age: 59
End: 2024-10-14
Payer: COMMERCIAL

## 2024-10-14 ENCOUNTER — HOSPITAL ENCOUNTER (EMERGENCY)
Age: 59
Discharge: HOME | End: 2024-10-14
Payer: COMMERCIAL

## 2024-10-14 ENCOUNTER — APPOINTMENT (OUTPATIENT)
Dept: GENERAL RADIOLOGY | Age: 59
End: 2024-10-14
Payer: COMMERCIAL

## 2024-10-14 VITALS
HEART RATE: 96 BPM | TEMPERATURE: 100 F | DIASTOLIC BLOOD PRESSURE: 61 MMHG | HEIGHT: 66 IN | OXYGEN SATURATION: 94 % | RESPIRATION RATE: 20 BRPM | BODY MASS INDEX: 34.55 KG/M2 | SYSTOLIC BLOOD PRESSURE: 93 MMHG | WEIGHT: 215 LBS

## 2024-10-14 DIAGNOSIS — R06.2 WHEEZE: ICD-10-CM

## 2024-10-14 DIAGNOSIS — J06.9 VIRAL URI WITH COUGH: Primary | ICD-10-CM

## 2024-10-14 DIAGNOSIS — J45.21 MILD INTERMITTENT ASTHMA WITH ACUTE EXACERBATION (HCC): ICD-10-CM

## 2024-10-14 DIAGNOSIS — J11.1 INFLUENZA: Primary | ICD-10-CM

## 2024-10-14 DIAGNOSIS — R05.1 ACUTE COUGH: ICD-10-CM

## 2024-10-14 LAB
POCT INFLUENZA A: POSITIVE
POCT INFLUENZA B: NEGATIVE
SARS-COV-2 RNA RESP QL NAA+PROBE: NOT DETECTED

## 2024-10-14 PROCEDURE — 87502 INFLUENZA DNA AMP PROBE: CPT

## 2024-10-14 PROCEDURE — 99284 EMERGENCY DEPT VISIT MOD MDM: CPT

## 2024-10-14 PROCEDURE — 87502 INFLUENZA DNA AMP PROBE: CPT | Performed by: EMERGENCY MEDICINE

## 2024-10-14 PROCEDURE — 71046 X-RAY EXAM CHEST 2 VIEWS: CPT

## 2024-10-14 RX ORDER — BENZONATATE 100 MG/1
100 CAPSULE ORAL 3 TIMES DAILY PRN
Qty: 30 CAPSULE | Refills: 0 | Status: SHIPPED | OUTPATIENT
Start: 2024-10-14 | End: 2024-11-13

## 2024-10-14 RX ORDER — PREDNISONE 20 MG/1
40 TABLET ORAL DAILY
Qty: 10 TABLET | Refills: 0 | Status: SHIPPED | OUTPATIENT
Start: 2024-10-14 | End: 2024-10-19

## 2024-10-14 RX ORDER — ALBUTEROL SULFATE 90 UG/1
INHALANT RESPIRATORY (INHALATION)
Qty: 6.7 G | Refills: 0 | Status: SHIPPED | OUTPATIENT
Start: 2024-10-14

## 2024-10-14 RX ORDER — PREDNISONE 20 MG/1
40 TABLET ORAL ONCE
Status: COMPLETED | OUTPATIENT
Start: 2024-10-14 | End: 2024-10-14

## 2024-10-14 RX ORDER — ALBUTEROL SULFATE 90 UG/1
2 INHALANT RESPIRATORY (INHALATION) EVERY 4 HOURS PRN
Qty: 1 EACH | Refills: 0 | Status: SHIPPED | OUTPATIENT
Start: 2024-10-14 | End: 2024-11-13

## 2024-10-14 RX ORDER — OSELTAMIVIR PHOSPHATE 75 MG/1
75 CAPSULE ORAL 2 TIMES DAILY
Qty: 10 CAPSULE | Refills: 0 | Status: SHIPPED | OUTPATIENT
Start: 2024-10-14 | End: 2024-10-19

## 2024-10-14 NOTE — ED INITIAL ASSESSMENT (HPI)
Patient presents with fever, cough, sinus congestion, and body aches starting Saturday.   Had video visit today and outpatient chest xray ordered. Feels worse.

## 2024-10-14 NOTE — PROGRESS NOTES
[facility]    Virtual Video Check-In    Jolie Orellana verbally consents to a Virtual/video Check-In visit on 10/14/24. Patient has been referred to the [facility] website at [email or web address] to review the yearly Consent to Treat document.    Patient understands and accepts financial responsibility for any deductible, co-insurance and/or co-pays associated with this service.    CHIEF COMPLAINT     Chief Complaint   Patient presents with    Viral Syndrome     Started on Saturday - Fatigue, Fevers - 102*, Cough - Dry, Chest Congestion/Heaviness/Wheezing/Crackling/SOB with Activity, Sinus Congestion/Drainage, Body Aches - 8-9/10 Pain Scale    Covid Test - 10/13/24 = Negative       HPI:   Jolie Orellana is a 58 year old female with complaints of fatigue, fevers Tmax 102, dry cough, chest congestion and heaviness, wheezing, shortness of breath with activity, sinus congestion, runny nose, fatigue, and body aches that started Saturday. Denies CP, loss of taste/smell, nausea, vomiting, or diarrhea. Taking Tylenol prn for fevers. She did at-home covid test yesterday which was negative. She used her nebulizer yesterday.     Current Outpatient Medications   Medication Sig Dispense Refill    predniSONE 20 MG Oral Tab Take 2 tablets (40 mg total) by mouth daily for 5 days. 10 tablet 0    albuterol 108 (90 Base) MCG/ACT Inhalation Aero Soln INHALE 1-2 PUFFS BY MOUTH EVERY 6 HOURS AS NEEDED FOR WHEEZE OR SHORTNESS OF BREATH 6.7 g 0    ROSUVASTATIN 10 MG Oral Tab TAKE 1 TABLET BY MOUTH EVERY DAY AT NIGHT 90 tablet 1    lisinopril 10 MG Oral Tab Take 1 tablet (10 mg total) by mouth daily. 90 tablet 1    metFORMIN  MG Oral Tablet 24 Hr Take 1 tablet (500 mg total) by mouth daily with breakfast. 90 tablet 1    ALBUTEROL (2.5 MG/3ML) 0.083% Inhalation Nebu Soln INHALE 3 ML BY NEBULIZATION EVERY 6 HOURS AS NEEDED FOR WHEEZING 150 mL 0    Ferrous Sulfate (IRON) 325 (65 Fe) MG Oral Tab Take by mouth every other day.       METAMUCIL FIBER OR Take 5 g by mouth nightly. Takes 3 at night      Ascorbic Acid (VITAMIN C OR) Take by mouth daily.      ZINC OR Take by mouth daily.      fluticasone propionate 50 MCG/ACT Nasal Suspension SPRAY 2 SPRAYS INTO EACH NOSTRIL EVERY DAY (Patient taking differently: daily as needed. SPRAY 2 SPRAYS INTO EACH NOSTRIL EVERY DAY) 3 each 1    Omeprazole 40 MG Oral Capsule Delayed Release TAKE 1 CAPSULE BY MOUTH EVERY DAY 90 capsule 3    levothyroxine 125 MCG Oral Tab Take 1 tablet (125 mcg total) by mouth daily. 90 tablet 3    latanoprost 0.005 % Ophthalmic Solution Place 1 drop into both eyes nightly.      B Complex Vitamins (VITAMIN B-COMPLEX OR) Take 1 Dose by mouth daily.      NON FORMULARY Take 1 tablet by mouth daily. Unforgettable's for cognitive health      Calcium 500-125 MG-UNIT Oral Tab Take by mouth.      Vitamin D3 2000 units Oral Cap Take 1 capsule (2,000 Units total) by mouth daily.        Past Medical History:    Allergic rhinitis    Antral gastritis    Asthma (HCC)    Cervical dysplasia    Condyloma    Diabetes mellitus (HCC)    Eczema    foot    Esophagitis    candida    Eye disease    Fatigue    GERD (gastroesophageal reflux disease)    chronic heartburn    Heartburn    More than 10 years ago?    High cholesterol    Hypercholesteremia    Hyperlipidemia    Hyperthyroidism    managed by Dr. Foreman     OCP (oral contraceptive pills) initiation    Oral allergy syndrome    Allery to pollen that cross reacts with foods such as raw fruits/veggies and nuts. Oral reaction w/ingestion. Does not progress to anaphylaxis.    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[employer]   Tobacco Use     Smoking status: Former     Current packs/day: 0.00     Average packs/day: 0.5 packs/day for 8.0 years (4.0 ttl pk-yrs)     Types: Cigarettes     Start date: 2005     Quit date: 2013     Years since quittin.7     Passive exposure: Past    Smokeless tobacco: Never    Tobacco comments:     Updated 24   Vaping Use    Vaping status: Never Used   Substance and Sexual Activity    Alcohol use: Not Currently    Drug use: Never    Sexual activity: Not Currently   Other Topics Concern    Caffeine Concern Yes    Stress Concern No    Weight Concern No    Special Diet No    Exercise Yes    Seat Belt Yes        REVIEW OF SYSTEMS:   See HPI.    EXAM:   Limited due to COVID-19  GENERAL: Appears stated age and in no apparent distress. Alert and oriented x3.   LUNGS: Patient speaks clearly in full sentences without dyspnea, cough noted while on video visit.   PSYCH: Appropriate mood and affect.    LABS:      Lab Results   Component Value Date    WBC 6.9 2024    RBC 3.88 2024    HGB 12.1 2024    HCT 37.8 2024    MCV 97.4 2024    MCH 31.2 2024    MCHC 32.0 2024    RDW 12.8 2024     2024      Lab Results   Component Value Date    GLU 94 2024    BUN 16 2024    BUNCREA SEE NOTE: 2024    CREATSERUM 0.65 2024    ANIONGAP 5 2020     2017    GFRNAA 95 2022    GFRAA 110 2022    CA 9.1 2024    OSMOCALC 291 2020    ALKPHO 83 2024    AST 16 2024    ALT 10 2024    BILT 0.5 2024    TP 6.5 2024    ALB 3.8 2024    GLOBULIN 2.7 2024    AGRATIO 1.4 2024     2024    K 4.7 2024     2024    CO2 26 2024      Lab Results   Component Value Date    CHOLEST 153 2024    TRIG 130 2024    HDL 62 2024    LDL 70 2024    VLDL 26 2017    TCHDLRATIO 2.5 2024    NONHDLC 91 2024      Lab Results    Component Value Date    T4F 1.52 01/25/2022    TSH 0.727 01/25/2022      Lab Results   Component Value Date     11/16/2017    A1C 5.9 (H) 07/29/2024        IMAGING:     No results found.     ASSESSMENT AND PLAN:      1. Viral URI with cough  2. Mild intermittent asthma with acute exacerbation (HCC)  -Do CXR as patient high risk for pneumonia  -Start course of prednisone, SE discussed  -Continue albuterol inhaler prn  -Can do nebulizer prn. Aware not to combine nebulizer and inhaler together due to SE of tachycardia   -Declines tessalon perles  -Supportive care discussed  -Go to ER for worsening shortness of breath or chest pain  - predniSONE 20 MG Oral Tab; Take 2 tablets (40 mg total) by mouth daily for 5 days.  Dispense: 10 tablet; Refill: 0  - XR CHEST PA + LAT CHEST (CPT=71046); Future  - albuterol 108 (90 Base) MCG/ACT Inhalation Aero Soln; INHALE 1-2 PUFFS BY MOUTH EVERY 6 HOURS AS NEEDED FOR WHEEZE OR SHORTNESS OF BREATH  Dispense: 6.7 g; Refill: 0    The patient indicates understanding of these issues and agrees to the plan.    The patient is asked to return as needed or when routine care is due.    Shannon Lerma, MELISSA  10/14/2024    Patient understands phone/video evaluation is not a substitute for face-to-face examination or emergency care. Patient advised to go to the ER or call 911 for worsening symptoms or acute distress. The patient was also advised of the potential privacy & security concerns related to the telehealth platform.     Lastly, the patient confirmed that they were in [place].     Please note that the following visit was completed using two-way, real-time interactive audio and video communication. This has been done in good isaiah to provide continuity of care in the best interest of the provider-patient relationship, due to the ongoing public health crisis/national emergency and because of restrictions of visitation. There are limitations of this visit as no physical exam could be  performed. Every conscious effort was taken to allow for sufficient and adequate time. This billing was spent on reviewing labs, medications, radiology tests and decision making. Appropriate medical decision-making and tests are ordered as detailed in the plan of care above.

## 2024-10-14 NOTE — DISCHARGE INSTRUCTIONS
Increase water intake 2-3 liters daily   Your primary care provider prescribed prednisone.  Please pick this up from the pharmacy and start taking this with the medications that I sent to the pharmacy for you.

## 2024-10-14 NOTE — ED PROVIDER NOTES
Patient Seen in: [facility] In [place]      History     Chief Complaint   Patient presents with    Fever    Cough/URI    Body ache and/or chills     Stated Complaint: fever,chest congestion, bodyaches 3 days - covid yesterday    Subjective:   HPI      58-year-old female with asthma, hypertension, diabetes presents today with complaints of fever, chest congestion and bodyaches for 3 days.  Patient states she checked for COVID yesterday it was negative.  Patient states she had a telehealth visit with her primary care provider office and a chest x-ray was ordered as an outpatient but she did not complete it and she came here to the emergency room instead.  Patient states that her family doctor did prescribe her some medications that she did not  and did not start.  Patient denies any shortness of breath associated with the cough.    Objective:     Past Medical History:    Allergic rhinitis    Antral gastritis    Asthma (HCC)    Cervical dysplasia    Condyloma    Diabetes mellitus (HCC)    Eczema    foot    Esophagitis    candida    Eye disease    Fatigue    GERD (gastroesophageal reflux disease)    chronic heartburn    Heartburn    More than 10 years ago?    High cholesterol    Hypercholesteremia    Hyperlipidemia    Hyperthyroidism    managed by Dr. Foreman     OCP (oral contraceptive pills) initiation    Oral allergy syndrome    Allery to pollen that cross reacts with foods such as raw fruits/veggies and nuts. Oral reaction w/ingestion. Does not progress to anaphylaxis.    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[employer]   Tobacco Use    Smoking status: Former     Current packs/day: 0.00     Average packs/day: 0.5 packs/day for 8.0 years (4.0 ttl pk-yrs)     Types: Cigarettes     Start date: 2005     Quit date: 2013     Years since quittin.7     Passive exposure: Past    Smokeless tobacco: Never    Tobacco comments:     Updated 24   Vaping Use    Vaping status: Never Used   Substance and Sexual Activity    Alcohol use: Not Currently    Drug use: Never    Sexual activity: Not Currently   Other Topics Concern    Caffeine Concern Yes    Stress Concern No    Weight Concern No    Special Diet No    Exercise Yes    Seat Belt Yes                  Physical Exam     ED Triage Vitals [10/14/24 1726]   BP 93/61   Pulse 96   Resp 20   Temp 99.8 °F (37.7 °C)   Temp src Oral   SpO2 94 %   O2 Device None (Room air)       Current Vitals:   Vital Signs  BP: 93/61  Pulse: 96  Resp: 20  Temp: 99.8 °F (37.7 °C)  Temp src: Oral    Oxygen Therapy  SpO2: 94 %  O2 Device: None (Room air)        Physical Exam  Vitals and nursing note reviewed. Exam conducted with a chaperone present.   Constitutional:       Appearance: Normal appearance.   HENT:      Head: Normocephalic.      Right Ear: Tympanic membrane, ear canal and external ear normal.      Left Ear: Tympanic membrane, ear canal and external ear normal.      Nose: Nose normal.      Mouth/Throat:      Mouth: Mucous membranes are moist.      Pharynx: Oropharynx is clear.   Eyes:      Extraocular Movements: Extraocular movements intact.      Conjunctiva/sclera: Conjunctivae normal.      Pupils: Pupils are equal, round, and reactive to light.   Cardiovascular:      Rate and Rhythm: Normal rate and regular rhythm.      Pulses: Normal pulses.      Heart sounds: Normal heart sounds.   Pulmonary:      Effort: Pulmonary effort is normal.      Breath sounds: Wheezing present.   Musculoskeletal:      Cervical back: Normal range  of motion and neck supple.   Skin:     Capillary Refill: Capillary refill takes less than 2 seconds.   Neurological:      General: No focal deficit present.      Mental Status: She is alert.   Psychiatric:         Mood and Affect: Mood normal.           ED Course     Labs Reviewed   POCT FLU TEST - Abnormal; Notable for the following components:       Result Value    POCT INFLUENZA A Positive (*)     All other components within normal limits    Narrative:     This assay is a rapid molecular in vitro test utilizing nucleic acid amplification of influenza A and B viral RNA.   RAPID SARS-COV-2 BY PCR - Normal                   MDM        58-year-old female with asthma, hypertension, diabetes presents today with complaints of fever, chest congestion and bodyaches for 3 days.  Patient states she checked for COVID yesterday it was negative.  Patient states she had a telehealth visit with her primary care provider office and a chest x-ray was ordered as an outpatient but she did not complete it and she came here to the emergency room instead.  Patient states that her family doctor did prescribe her some medications that she did not  and did not start.  Patient denies any shortness of breath associated with the cough.  Vital signs: Please see EMR.  Physical exam: Please see exam.  Differential diagnosis: Influenza, COVID, pneumonia, bronchitis.  Recent Results (from the past 24 hours)   Rapid SARS-CoV-2 by PCR    Collection Time: 10/14/24  5:29 PM    Specimen: Nares; Other   Result Value Ref Range    Rapid SARS-CoV-2 by PCR Not Detected Not Detected   POCT Flu Test    Collection Time: 10/14/24  5:29 PM    Specimen: Nares; Other   Result Value Ref Range    POCT INFLUENZA A Positive (A) Negative    POCT INFLUENZA B Negative Negative     XR CHEST PA + LAT CHEST (CPT=71046)    Result Date: 10/14/2024  CONCLUSION:  No evidence of active cardiopulmonary disease.  Stable chest x-ray.   LOCATION:  [facility]   Dictated by (CST):  Jhon Barron MD on 10/14/2024 at 5:49 PM     Finalized by (CST): Jhon Barron MD on 10/14/2024 at 5:50 PM      Based on physical exam and HPI and objective lab values/x-ray will diagnosed with influenza, cough and wheezing.  Patient was prescribed prednisone advised patient to pick this medication up from the pharmacy in conjunction with Tamiflu.  Will also prescribe Tessalon and ProAir.  Patient instructed to increase her water intake 2 to 3 L a day and follow-up with her primary care provider within 1 week.  ED precautions given.    Medical Decision Making  58-year-old female with asthma, hypertension, diabetes presents today with complaints of fever, chest congestion and bodyaches for 3 days.  Patient states she checked for COVID yesterday it was negative.  Patient states she had a telehealth visit with her primary care provider office and a chest x-ray was ordered as an outpatient but she did not complete it and she came here to the emergency room instead.  Patient states that her family doctor did prescribe her some medications that she did not  and did not start.  Patient denies any shortness of breath associated with the cough.    Problems Addressed:  Acute cough: acute illness or injury  Influenza: acute illness or injury  Wheeze: acute illness or injury    Amount and/or Complexity of Data Reviewed  Labs: ordered. Decision-making details documented in ED Course.  Radiology: ordered. Decision-making details documented in ED Course.  ECG/medicine tests: ordered. Decision-making details documented in ED Course.    Risk  Prescription drug management.        Disposition and Plan     Clinical Impression:  1. Influenza    2. Acute cough    3. Wheeze         Disposition:  Discharge  10/14/2024  5:58 pm    Follow-up:  Carol Ellsworth MD  [address] [id number] [phone]    Follow up in 1 week(s)            Medications Prescribed:  Current Discharge Medication List        START  taking these medications    Details   oseltamivir (TAMIFLU) 75 MG Oral Cap Take 1 capsule (75 mg total) by mouth 2 (two) times daily for 5 days.  Qty: 10 capsule, Refills: 0      benzonatate 100 MG Oral Cap Take 1 capsule (100 mg total) by mouth 3 (three) times daily as needed for cough.  Qty: 30 capsule, Refills: 0      !! albuterol 108 (90 Base) MCG/ACT Inhalation Aero Soln Inhale 2 puffs into the lungs every 4 (four) hours as needed for Wheezing.  Qty: 1 each, Refills: 0       !! - Potential duplicate medications found. Please discuss with provider.              Supplementary Documentation:

## 2024-10-15 RX ORDER — ALBUTEROL SULFATE 0.83 MG/ML
2.5 SOLUTION RESPIRATORY (INHALATION) EVERY 6 HOURS PRN
Qty: 150 ML | Refills: 0 | Status: SHIPPED | OUTPATIENT
Start: 2024-10-15

## 2024-10-21 ENCOUNTER — OFFICE VISIT (OUTPATIENT)
Dept: OBGYN CLINIC | Facility: CLINIC | Age: 59
End: 2024-10-21
Payer: COMMERCIAL

## 2024-10-21 VITALS
HEART RATE: 67 BPM | BODY MASS INDEX: 35.79 KG/M2 | DIASTOLIC BLOOD PRESSURE: 66 MMHG | SYSTOLIC BLOOD PRESSURE: 104 MMHG | WEIGHT: 212.19 LBS | HEIGHT: 64.5 IN

## 2024-10-21 DIAGNOSIS — Z12.31 ENCOUNTER FOR SCREENING MAMMOGRAM FOR MALIGNANT NEOPLASM OF BREAST: ICD-10-CM

## 2024-10-21 DIAGNOSIS — Z12.4 SCREENING FOR CERVICAL CANCER: ICD-10-CM

## 2024-10-21 DIAGNOSIS — Z01.419 WELL WOMAN EXAM WITH ROUTINE GYNECOLOGICAL EXAM: Primary | ICD-10-CM

## 2024-10-21 PROCEDURE — 87624 HPV HI-RISK TYP POOLED RSLT: CPT | Performed by: OBSTETRICS & GYNECOLOGY

## 2024-10-21 PROCEDURE — 88175 CYTOPATH C/V AUTO FLUID REDO: CPT | Performed by: OBSTETRICS & GYNECOLOGY

## 2024-10-21 NOTE — PROGRESS NOTES
[facility]  Obstetrics and Gynecology  History & Physical    CC: Patient presents for a well woman exam     Subjective:     HPI: Jolie Orellana is a 58 year old  female here for a well women exam. Patient reports doing well.  Patient moved she was adopted and does not have family medical history available.    OB History:  OB History    Para Term  AB Living   0 0 0 0 0 0   SAB IAB Ectopic Multiple Live Births   0 0 0 0 0       Gyne History:  Hx Prior Abnormal Pap: Yes  Pap Date: 23  Pap Result Notes: wnl  No LMP recorded (lmp unknown). Patient is postmenopausal.    NILM, HPV 2023  Hx of abnormal pap smear in early 90s s/p laser surgery on cervix and normal pap smear to follow  denies history of STDs (non-HPV).   Sexual history: Active? Not current        Meds:  Medications Ordered Prior to Encounter[1]    All:  Allergies[2]    PMH:  Past Medical History:    Allergic rhinitis    Antral gastritis    Asthma (HCC)    Cervical dysplasia    Condyloma    Diabetes mellitus (HCC)    Eczema    foot    Esophagitis    candida    Eye disease    Fatigue    GERD (gastroesophageal reflux disease)    chronic heartburn    Heartburn    More than 10 years ago?    High cholesterol    Hypercholesteremia    Hyperlipidemia    Hyperthyroidism    managed by Dr. Foreman     OCP (oral contraceptive pills) initiation    Oral allergy syndrome    Allery to pollen that cross reacts with foods such as raw fruits/veggies and nuts. Oral reaction w/ingestion. Does not progress to anaphylaxis.    NICOLAS (obstructive sleep apnea)    AHI 27     Other specified disorders of thyroid    Graves Disease    RAD (reactive airway disease) (HCC)    Rib contusion    Sinusitis    Wears glasses    Weight gain       Immunization History:   Immunization History   Administered Date(s) Administered    >=3 YRS FLUZONE OR FLUARIX QUAD PRESERVE FREE SINGLE DOSE (50683) FLU CLINIC 2017    Covid-19 Vaccine Moderna 100 mcg/0.5 ml  04/15/2021, 2021, 2022    FLULAVAL 6 months & older 0.5 ml Prefilled syringe (44598) 10/29/2019    FLUZONE 6 months and older PFS 0.5 ml (43026) 2017    Pneumococcal (Prevnar 13) 2017    Pneumovax 23 10/01/2019    Td 2001       PSH:  Past Surgical History:   Procedure Laterality Date    Colonoscopy      Egd      Upper gi endoscopy,exam  2010       Social History:  Social History     Socioeconomic History    Marital status: Single     Spouse name: Not on file    Number of children: Not on file    Years of education: Not on file    Highest education level: Not on file   Occupational History    Occupation: asst  [employer]   Tobacco Use    Smoking status: Former     Current packs/day: 0.00     Average packs/day: 0.5 packs/day for 8.0 years (4.0 ttl pk-yrs)     Types: Cigarettes     Start date: 2005     Quit date: 2013     Years since quittin.8     Passive exposure: Past    Smokeless tobacco: Never    Tobacco comments:     Updated 24   Vaping Use    Vaping status: Never Used   Substance and Sexual Activity    Alcohol use: Not Currently    Drug use: Never    Sexual activity: Not Currently   Other Topics Concern     Service Not Asked    Blood Transfusions Not Asked    Caffeine Concern Yes    Occupational Exposure Not Asked    Hobby Hazards Not Asked    Sleep Concern Not Asked    Stress Concern No    Weight Concern No    Special Diet No    Back Care Not Asked    Exercise Yes    Bike Helmet Not Asked    Seat Belt Yes    Self-Exams Not Asked   Social History Narrative    Not on file     Social Drivers of Health     Financial Resource Strain: Not on file   Food Insecurity: Not on file   Transportation Needs: Not on file   Physical Activity: Not on file   Stress: Not on file   Social Connections: Not on file   Housing Stability: Not on file         Patient feels unsafe or threatened?: denies    Abuse: denies physical, sexual or mental.     Family  History:  Family History   Adopted: Yes   Problem Relation Age of Onset    No Known Problems Father     No Known Problems Mother     No Known Problems Maternal Grandmother     No Known Problems Maternal Grandfather     No Known Problems Paternal Grandmother     No Known Problems Paternal Grandfather        Health maintenance:  Mammogram (age 40 and q1-2yr): 08/2024  Impression   CONCLUSION:       BI-RADS CATEGORY:    DIAGNOSTIC CATEGORY 2--BENIGN FINDING NO CHANGE FROM COMPARISON ASSESSMENT.       RECOMMENDATIONS:    ROUTINE MAMMOGRAM AND CLINICAL EVALUATION IN 12 MONTHS.       Colonoscopy (age 45 and q10yr): 2023 and return now every 5 years    Review of Systems:  General: no complaints per category. See HPI for additional information.   Breast: no complaints per category. See HPI for additional information.   Respiratory: no complaints per category. See HPI for additional information.   Cardiovascular: no complaints per category. See HPI for additional information.   GI: no complaints per category. See HPI for additional information.   : no complaints per category. See HPI for additional information.   Heme: no complaints per category. See HPI for additional information.       Objective:     Vitals:    10/21/24 1457   BP: 104/66   Pulse: 67   Weight: 212 lb 3.2 oz (96.3 kg)   Height: 64.5\"         Body mass index is 35.86 kg/m².    General: AAO.NAD.   CVS exam: normal peripheral perfusion  Chest: non-labored breathing, no tachypnea   Breast:  symmetric, no dominant or suspicious mass, no skin or nipple changes and no axillary adenopathy  Abdominal exam: soft, nontender, nondistended  Pelvic exam:   VULVA: normal appearing vulva with no masses, tenderness or lesions  PERINEUM:  normal appearing, no lesions   URETHRAL MEATUS:  normal appearing, no lesions   VAGINA: normal appearing vagina with normal color and discharge, no lesions  CERVIX: normal appearing cervix without discharge or lesions  UTERUS: uterus is  normal size, shape, consistency and nontender  ADNEXA: normal adnexa in size, nontender and no masses  PERIRECTAL: normal appearing, no lesions   Ext: non-tender, no edema    Assessment:     Jolie Orellana is a 58 year old  female here for a well women exam.       Plan:       Problem List Items Addressed This Visit    None  Visit Diagnoses       Well woman exam with routine gynecological exam    -  Primary    Encounter for screening mammogram for malignant neoplasm of breast        Relevant Orders    KYLE JENNIFER 2D+3D SCREENING BILAT (CPT=77067/93664)    Screening for cervical cancer        Relevant Orders    ThinPrep PAP with HPV Reflex Request B            Cervical cancer screening  - discussion held with the patient about ASCCP guidelines  - repeat pap smear today   Health maintenance  - encouraged to maintain weight at healthy BMI  - discussed importance of exercise and healthy eating  - self breast exam instructions provided   - bilateral screening mammogram recommendations discussed and order provided          Problem List Items Addressed This Visit    None  Visit Diagnoses       Well woman exam with routine gynecological exam    -  Primary    Encounter for screening mammogram for malignant neoplasm of breast        Relevant Orders    KYLE JENNIFER 2D+3D SCREENING BILAT (CPT=77067/76746)    Screening for cervical cancer        Relevant Orders    ThinPrep PAP with HPV Reflex Request B                  All of the findings and plan were discussed with the patient.  She notes understanding and agrees with the plan of care.  All questions were answered to the best of my ability at this time.      RTC in 1 year for a well woman exam or sooner if needed     Rhea Carcamo MD   [facility] - OBGYN      Discussed with patient that there will not be further notification of normal or benign results other than receiving results on [employer]. A [employer] message or telephone call will be placed by the physician and/or office staff if  results are abnormal.       Note to patient and family   The 21st Century Cures Act makes medical notes available to patients in the interest of transparency.  However, please be advised that this is a medical document.  It is intended as [id number] communication.  It is written and medical language may contain abbreviations or verbiage that are technical and unfamiliar.  It may appear blunt or direct.  Medical documents are intended to carry relevant information, facts as evident, and the clinical opinion of the practitioner.      This note could include assistance by Dragon voice recognition. Errors in content may be related to improper recognition by the system; efforts to review and correct have been done but errors may still exist.            [1]   Current Outpatient Medications on File Prior to Visit   Medication Sig Dispense Refill    ALBUTEROL (2.5 MG/3ML) 0.083% Inhalation Nebu Soln INHALE 3 ML BY NEBULIZATION EVERY 6 HOURS AS NEEDED FOR WHEEZING 150 mL 0    albuterol 108 (90 Base) MCG/ACT Inhalation Aero Soln INHALE 1-2 PUFFS BY MOUTH EVERY 6 HOURS AS NEEDED FOR WHEEZE OR SHORTNESS OF BREATH 6.7 g 0    benzonatate 100 MG Oral Cap Take 1 capsule (100 mg total) by mouth 3 (three) times daily as needed for cough. 30 capsule 0    albuterol 108 (90 Base) MCG/ACT Inhalation Aero Soln Inhale 2 puffs into the lungs every 4 (four) hours as needed for Wheezing. 1 each 0    ROSUVASTATIN 10 MG Oral Tab TAKE 1 TABLET BY MOUTH EVERY DAY AT NIGHT 90 tablet 1    lisinopril 10 MG Oral Tab Take 1 tablet (10 mg total) by mouth daily. 90 tablet 1    metFORMIN  MG Oral Tablet 24 Hr Take 1 tablet (500 mg total) by mouth daily with breakfast. 90 tablet 1    Ferrous Sulfate (IRON) 325 (65 Fe) MG Oral Tab Take by mouth every other day.      METAMUCIL FIBER OR Take 5 g by mouth nightly. Takes 3 at night      Ascorbic Acid (VITAMIN C OR) Take by mouth daily.      ZINC OR Take by mouth daily.      fluticasone propionate 50  MCG/ACT Nasal Suspension SPRAY 2 SPRAYS INTO EACH NOSTRIL EVERY DAY (Patient taking differently: daily as needed. SPRAY 2 SPRAYS INTO EACH NOSTRIL EVERY DAY) 3 each 1    Omeprazole 40 MG Oral Capsule Delayed Release TAKE 1 CAPSULE BY MOUTH EVERY DAY 90 capsule 3    levothyroxine 125 MCG Oral Tab Take 1 tablet (125 mcg total) by mouth daily. 90 tablet 3    latanoprost 0.005 % Ophthalmic Solution Place 1 drop into both eyes nightly.      B Complex Vitamins (VITAMIN B-COMPLEX OR) Take 1 Dose by mouth daily.      NON FORMULARY Take 1 tablet by mouth daily. Unforgettable's for cognitive health      Calcium 500-125 MG-UNIT Oral Tab Take by mouth.      Vitamin D3 2000 units Oral Cap Take 1 capsule (2,000 Units total) by mouth daily.       No current facility-administered medications on file prior to visit.   [2]   Allergies  Allergen Reactions    Antihistamines, Diphenhydramine-Type UNKNOWN     OTC Antihistimines    Methimazole [Thiamazole] HIVES    Singulair [Montelukast] HIVES, RASH and ITCHING    Sudafed UNKNOWN     Heart palpitations.    Dust Mites RASH and ITCHING     Runny /itchy nose, sneezing , nasal congestion; headaches; post nasal drainage; hoarseness; throat clearing.      Mold RASH and ITCHING     Runny /itchy nose, sneezing , nasal congestion; headaches; post nasal drainage; hoarseness; throat clearing.    Pollen ITCHING     Itchy mouth and throat when eating many fresh vegetables and nuts. Dx Oral Allergy Syndrome on 10/22/19. Recommend daily INCS during problematic seasons - fluticasone 2 sp/n daily. Claritin or Zyrtec 10mg daily as needed. Albuterol rescue inhaler on hand for as needed.

## 2024-10-25 LAB
.: NORMAL
.: NORMAL

## 2024-10-28 LAB — HPV E6+E7 MRNA CVX QL NAA+PROBE: NEGATIVE

## 2024-11-04 ENCOUNTER — HOSPITAL ENCOUNTER (OUTPATIENT)
Dept: ULTRASOUND IMAGING | Age: 59
Discharge: HOME | End: 2024-11-04
Attending: NURSE PRACTITIONER
Payer: COMMERCIAL

## 2024-11-04 DIAGNOSIS — R92.30 DENSE BREAST: ICD-10-CM

## 2024-11-04 PROCEDURE — 76641 ULTRASOUND BREAST COMPLETE: CPT | Performed by: NURSE PRACTITIONER

## 2024-11-06 DIAGNOSIS — B00.1 COLD SORE: ICD-10-CM

## 2024-11-06 RX ORDER — VALACYCLOVIR HYDROCHLORIDE 1 G/1
2000 TABLET, FILM COATED ORAL EVERY 12 HOURS SCHEDULED
Qty: 4 TABLET | Refills: 1 | Status: SHIPPED | OUTPATIENT
Start: 2024-11-06 | End: 2024-11-07

## 2024-11-06 NOTE — TELEPHONE ENCOUNTER
Herpes Agent Protocol Sbijmt0711/06/2024 12:25 PM   Protocol Details In person appointment or virtual visit in the past 12 mos or appointment in next 3 mo          Future Appointments   Date Time Provider Department Center   12/2/2024  3:00 PM LastSamina APRN [facility]   2/18/2025 10:40 AM Carol Ellsworth MD [facility] [address]

## 2024-11-06 NOTE — TELEPHONE ENCOUNTER
Is this medication prescribed by the [facility] Providers? yes    Did the patient contact the pharmacy directly?:  no    Is patient out of meds or supply very low?:  no    Medication Requested:  vakact ckivur    Dose:  1 gram    Is patient requesting a 30 or 90 day supply?:  30    Pharmacy name and phone # or location:  [facility] #[id number] - [place] - [address] AT [address] OF [address], [phone], [phone]     Is the patient due for an appointment?: no  (if so, please schedule appt)    Additional Notes:      Please advise the patient refills take up to 72 business hours.

## 2024-12-05 ENCOUNTER — PATIENT MESSAGE (OUTPATIENT)
Dept: INTERNAL MEDICINE CLINIC | Facility: CLINIC | Age: 59
End: 2024-12-05

## 2024-12-05 NOTE — TELEPHONE ENCOUNTER
131/80 is okay.   145/90 is a bit high.   She is on a small dose of lisinopril.   Increase lisinopril to 20mg daily.   Rtc in 2 weeks to see matt for bp check. Measure bp at home once a day and bring in those readings to the visit.   If any lightheadedness or dizziness she should call the office.

## 2024-12-05 NOTE — TELEPHONE ENCOUNTER
Please see pt update, last saw Shannon for htn 8/13/24 and was stable. Are you able to advise on monitoring? Please advise, thanks!

## 2025-01-05 DIAGNOSIS — J30.9 ALLERGIC RHINITIS, UNSPECIFIED SEASONALITY, UNSPECIFIED TRIGGER: ICD-10-CM

## 2025-01-07 RX ORDER — FLUTICASONE PROPIONATE 50 MCG
SPRAY, SUSPENSION (ML) NASAL
Qty: 48 ML | Refills: 1 | Status: SHIPPED | OUTPATIENT
Start: 2025-01-07

## 2025-01-07 NOTE — TELEPHONE ENCOUNTER
Allergy Medication Protocol [id number]/05/2025 07:13 AM   Protocol Details In person appointment or virtual visit in the past 12 mos or appointment in next 3 mos        Future Appointments   Date Time Provider Department Center   1/13/2025 10:20 AM Francois Rowe PA-C [facility] [address]   2/18/2025 10:40 AM Carol Ellsworth MD [facility] [address]   12/1/2025  1:00 PM Samina Trinh APRN [facility]

## 2025-01-13 ENCOUNTER — OFFICE VISIT (OUTPATIENT)
Facility: CLINIC | Age: 60
End: 2025-01-13
Payer: COMMERCIAL

## 2025-01-13 DIAGNOSIS — M17.12 PRIMARY OSTEOARTHRITIS OF LEFT KNEE: Primary | ICD-10-CM

## 2025-01-13 RX ORDER — TRIAMCINOLONE ACETONIDE 40 MG/ML
40 INJECTION, SUSPENSION INTRA-ARTICULAR; INTRAMUSCULAR ONCE
Status: COMPLETED | OUTPATIENT
Start: 2025-01-13 | End: 2025-01-13

## 2025-01-13 RX ADMIN — TRIAMCINOLONE ACETONIDE 40 MG: 40 INJECTION, SUSPENSION INTRA-ARTICULAR; INTRAMUSCULAR at 10:12:00

## 2025-01-13 NOTE — PROCEDURES
Patient reports relief from the injection up until around December.  Very satisfied with the outcome from the previous injection.    Risks and benefits of knee injection discussed with the patient, with risks including but not limited to pain and swelling at the injection site and/or within the knee joint, infection, elevation in blood pressure and/or glucose levels, facial flushing. After informed consent, the patient's left knee was marked, locally anesthetized with skin refrigerant, prepped with topical antiseptic, and injected with a mixture of 1mL 40mg/mL Kenalog, 2mL 1% lidocaine and 2mL 0.5% marcaine through the inferolateral portal.  A band-aid was applied.  The patient tolerated the procedure well.    Francois Rowe PA-C  [facility]opedic [facility]

## 2025-01-14 DIAGNOSIS — I10 ESSENTIAL HYPERTENSION: ICD-10-CM

## 2025-01-14 RX ORDER — LISINOPRIL 10 MG/1
10 TABLET ORAL DAILY
Qty: 90 TABLET | Refills: 0 | Status: SHIPPED | OUTPATIENT
Start: 2025-01-14

## 2025-01-14 NOTE — TELEPHONE ENCOUNTER
Incoming (mail or fax):  fax  [facility]  Documentation given to:  triage in      Needs to be 90 day supply per insurance

## 2025-01-14 NOTE — TELEPHONE ENCOUNTER
Hypertension Medications Protocol [id number]/14/2025 02:01 PM   Protocol Details CMP or BMP in past 12 months    Last BP reading less than 140/90    In person appointment or virtual visit in the past 12 mos or appointment in next 3 mos    EGFRCR or GFRNAA > 50    Medication is active on med list       Future Appointments   Date Time Provider Department Center   2/18/2025 10:40 AM Carol Ellsworth MD [facility] [address]   12/1/2025  1:00 PM Samina Trinh APRN [facility]

## 2025-01-21 ENCOUNTER — PATIENT MESSAGE (OUTPATIENT)
Dept: INTERNAL MEDICINE CLINIC | Facility: CLINIC | Age: 60
End: 2025-01-21

## 2025-01-21 DIAGNOSIS — Z13.21 ENCOUNTER FOR VITAMIN DEFICIENCY SCREENING: Primary | ICD-10-CM

## 2025-01-21 NOTE — TELEPHONE ENCOUNTER
Ok to add on Vit B and D?     Future Appointments   Date Time Provider Department Center   2/18/2025 10:40 AM Carol Ellsworth MD [facility] [address]   12/1/2025  1:00 PM Samina Trinh APRN [facility]

## 2025-02-11 LAB
ABSOLUTE BASOPHILS: 20 CELLS/UL (ref 0–200)
ABSOLUTE EOSINOPHILS: 73 CELLS/UL (ref 15–500)
ABSOLUTE LYMPHOCYTES: 1954 CELLS/UL (ref 850–3900)
ABSOLUTE MONOCYTES: 323 CELLS/UL (ref 200–950)
ABSOLUTE NEUTROPHILS: 4231 CELLS/UL (ref 1500–7800)
ALBUMIN/GLOBULIN RATIO: 1.7 (CALC) (ref 1–2.5)
ALBUMIN: 4 G/DL (ref 3.6–5.1)
ALKALINE PHOSPHATASE: 82 U/L (ref 37–153)
ALT: 13 U/L (ref 6–29)
AST: 12 U/L (ref 10–35)
BASOPHILS: 0.3 %
BILIRUBIN, TOTAL: 0.6 MG/DL (ref 0.2–1.2)
BUN: 17 MG/DL (ref 7–25)
CALCIUM: 9.1 MG/DL (ref 8.6–10.4)
CARBON DIOXIDE: 27 MMOL/L (ref 20–32)
CHLORIDE: 104 MMOL/L (ref 98–110)
CHOL/HDLC RATIO: 2 (CALC)
CHOLESTEROL, TOTAL: 142 MG/DL
CREATININE: 0.6 MG/DL (ref 0.5–1.03)
EGFR: 103 ML/MIN/1.73M2
EOSINOPHILS: 1.1 %
GLOBULIN: 2.4 G/DL (CALC) (ref 1.9–3.7)
GLUCOSE: 95 MG/DL (ref 65–99)
HDL CHOLESTEROL: 71 MG/DL
HEMATOCRIT: 36.9 % (ref 35–45)
HEMOGLOBIN A1C: 5.8 % OF TOTAL HGB
HEMOGLOBIN: 11.9 G/DL (ref 11.7–15.5)
LDL-CHOLESTEROL: 55 MG/DL (CALC)
LYMPHOCYTES: 29.6 %
MCH: 30.2 PG (ref 27–33)
MCHC: 32.2 G/DL (ref 32–36)
MCV: 93.7 FL (ref 80–100)
MONOCYTES: 4.9 %
MPV: 11.1 FL (ref 7.5–12.5)
NEUTROPHILS: 64.1 %
NON-HDL CHOLESTEROL: 71 MG/DL (CALC)
PLATELET COUNT: 241 THOUSAND/UL (ref 140–400)
POTASSIUM: 4.3 MMOL/L (ref 3.5–5.3)
PROTEIN, TOTAL: 6.4 G/DL (ref 6.1–8.1)
RDW: 12.7 % (ref 11–15)
RED BLOOD CELL COUNT: 3.94 MILLION/UL (ref 3.8–5.1)
SODIUM: 140 MMOL/L (ref 135–146)
TRIGLYCERIDES: 84 MG/DL
VITAMIN B12: 473 PG/ML (ref 200–1100)
VITAMIN D, 25-OH, TOTAL: 62 NG/ML (ref 30–100)
WHITE BLOOD CELL COUNT: 6.6 THOUSAND/UL (ref 3.8–10.8)

## 2025-02-16 NOTE — PROGRESS NOTES
[facility]    CHIEF COMPLAINT:   Chief Complaint   Patient presents with    Routine Physical     Reviewed Preventative/Wellness form with patient. Sees gyne. 10/21/24-pap. 8/13/24-mammo. 7/31/23-colon-repeat 5 years    Asthma     ACT-25    Immunization/Injection     Declines prevnar 20 and tetanus         HPI:   Jolie Orellana is a 59 year old female who presents for a complete physical exam. Symptoms: denies discharge, itching, burning or dysuria.     Sees gyne for pap and breast exam.   Pap done 10/2024 negative with negative hpv.   Mammo done 8/2024. Has an order from gyne for this year. Had dense breast advisory on last one which was ordered by us. Had a breast us which was negative.   Colonoscopy done 7/2023, repeat in 5 years.     Due prevnar 20. Pt declines.   Due tdap, shingrix, covid booster, flu shot. Declines.     Exercise: walks at work.     Derm: has a dermatologist and will see them.     Also bp and med check.   Htn: Taking meds regularly. No chest pain, no shortness of breath. Bp has been okay at home. Okay here today as well. She did not increase the lisinopril after the last [employer] message she sent me in December.     Hyperlipidemia: on statin. No muscle aches.  Goal LDL less than 70 due to coronary atherosclerosis.     Has gallstones. Asymptomatic.     Asthma: ACT 25. Controlled. No symptoms.     Pre diabetes: on metformin. Tolerating well. A1c reviewed.     Hypothyroid: on levothyroxine. No fatigue or palpitations. Sees endocrine.     Henrique: on cpap.     Cssr score of 1 today. Had an episode of trying to harm herself many years ago when she was a teenager. No active symptoms. She is feeling fine.     Wt Readings from Last 6 Encounters:   02/18/25 204 lb 8 oz (92.8 kg)   12/02/24 208 lb 9.6 oz (94.6 kg)   10/21/24 212 lb 3.2 oz (96.3 kg)   10/14/24 215 lb (97.5 kg)   08/13/24 208 lb 9.6 oz (94.6 kg)   06/17/24 207 lb (93.9 kg)     Body mass index is 34.56 kg/m².       Current Outpatient  Medications   Medication Sig Dispense Refill    VITAMIN D-VITAMIN K OR Take by mouth. Vitamin d3 1000iu with K 90mcg daily      lisinopril 10 MG Oral Tab Take 1 tablet (10 mg total) by mouth daily. 90 tablet 0    FLUTICASONE PROPIONATE 50 MCG/ACT Nasal Suspension SPRAY 2 SPRAYS INTO EACH NOSTRIL EVERY DAY (Patient taking differently: 2 sprays by Nasal route as needed.) 48 mL 1    OMEPRAZOLE 40 MG Oral Capsule Delayed Release TAKE 1 CAPSULE BY MOUTH EVERY DAY 90 capsule 3    ALBUTEROL (2.5 MG/3ML) 0.083% Inhalation Nebu Soln INHALE 3 ML BY NEBULIZATION EVERY 6 HOURS AS NEEDED FOR WHEEZING 150 mL 0    albuterol 108 (90 Base) MCG/ACT Inhalation Aero Soln INHALE 1-2 PUFFS BY MOUTH EVERY 6 HOURS AS NEEDED FOR WHEEZE OR SHORTNESS OF BREATH 6.7 g 0    ROSUVASTATIN 10 MG Oral Tab TAKE 1 TABLET BY MOUTH EVERY DAY AT NIGHT 90 tablet 1    metFORMIN  MG Oral Tablet 24 Hr Take 1 tablet (500 mg total) by mouth daily with breakfast. 90 tablet 1    Ferrous Sulfate (IRON) 325 (65 Fe) MG Oral Tab Take by mouth every other day.      METAMUCIL FIBER OR Take 5 g by mouth nightly. Takes 3 at night      Ascorbic Acid (VITAMIN C OR) Take by mouth daily.      ZINC OR Take by mouth daily.      levothyroxine 125 MCG Oral Tab Take 1 tablet (125 mcg total) by mouth daily. 90 tablet 3    latanoprost 0.005 % Ophthalmic Solution Place 1 drop into both eyes nightly.      B Complex Vitamins (VITAMIN B-COMPLEX OR) Take 1 Dose by mouth daily.      NON FORMULARY Take 1 tablet by mouth daily. Unforgettable's for cognitive health      Calcium 500-125 MG-UNIT Oral Tab Take by mouth daily.      Vitamin D3 2000 units Oral Cap Take 1 capsule (2,000 Units total) by mouth daily.        Past Medical History:    Allergic rhinitis    Antral gastritis    Asthma (HCC)    Cervical dysplasia    Condyloma    Diabetes mellitus (HCC)    Eczema    foot    Esophagitis    candida    Eye disease    Fatigue    GERD (gastroesophageal reflux disease)    chronic heartburn     Heartburn    More than 10 years ago?    High cholesterol    Hypercholesteremia    Hyperlipidemia    Hyperthyroidism    managed by Dr. Foreman     OCP (oral contraceptive pills) initiation    Oral allergy syndrome    Allery to pollen that cross reacts with foods such as raw fruits/veggies and nuts. Oral reaction w/ingestion. Does not progress to anaphylaxis.    NICOLAS (obstructive sleep apnea)    AHI 27     Other specified disorders of thyroid    Graves Disease    RAD (reactive airway disease) (HCC)    Rib contusion    Sinusitis    Wears glasses    Weight gain      Past Surgical History:   Procedure Laterality Date    Colonoscopy      Egd      Upper gi endoscopy,exam  2010      Family History   Adopted: Yes   Problem Relation Age of Onset    No Known Problems Father     No Known Problems Mother     No Known Problems Maternal Grandmother     No Known Problems Maternal Grandfather     No Known Problems Paternal Grandmother     No Known Problems Paternal Grandfather       Social History:   Social History     Socioeconomic History    Marital status: Single   Occupational History    Occupation: [employer]   Tobacco Use    Smoking status: Former     Current packs/day: 0.00     Average packs/day: 0.5 packs/day for 8.0 years (4.0 ttl pk-yrs)     Types: Cigarettes     Start date: 2005     Quit date: 2013     Years since quittin.1     Passive exposure: Past    Smokeless tobacco: Never    Tobacco comments:     Updated 24   Vaping Use    Vaping status: Never Used   Substance and Sexual Activity    Alcohol use: Not Currently    Drug use: Never    Sexual activity: Not Currently   Other Topics Concern    Caffeine Concern Yes    Stress Concern No    Weight Concern No    Special Diet No    Exercise Yes    Seat Belt Yes     Social Drivers of Health     Food Insecurity: No Food Insecurity (2025)    NCSS - Food Insecurity     Worried About Running Out of Food in the Last Year: No      Ran Out of Food in the Last Year: No   Transportation Needs: No Transportation Needs (2/18/2025)    NCSS - Transportation     Lack of Transportation: No   Housing Stability: Not At Risk (2/18/2025)    NCSS - Housing/Utilities     Has Housing: Yes     Worried About Losing Housing: No     Unable to Get Utilities: No     : single.    Exercise: walking.  Diet: watches minimally     REVIEW OF SYSTEMS:   GENERAL: feels well otherwise  SKIN: denies any unusual skin lesions  EYES:denies blurred vision or double vision  HEENT: denies nasal congestion, sinus pain or ST  LUNGS: denies shortness of breath with exertion  CARDIOVASCULAR: denies chest pain on exertion  GI: denies abdominal pain,denies heartburn  : denies dysuria, vaginal discharge or itching  MUSCULOSKELETAL: denies back pain  NEURO: denies headaches  PSYCHE: denies depression or anxiety  HEMATOLOGIC: denies hx of anemia  ENDOCRINE: see hpi  ALL/ASTHMA: has asthma    EXAM:   /66 (BP Location: Right arm, Patient Position: Sitting, Cuff Size: large)   Pulse 76   Temp 98.1 °F (36.7 °C) (Temporal)   Resp 16   Ht 5' 4.5\" (1.638 m)   Wt 204 lb 8 oz (92.8 kg)   LMP  (LMP Unknown)   Breastfeeding No   BMI 34.56 kg/m²   Body mass index is 34.56 kg/m².   GENERAL: well developed, well nourished,in no apparent distress  SKIN: no rashes,no suspicious lesions  HEENT: atraumatic, normocephalic,ears and throat are clear  EYES:PERRLA, conjunctiva are clear  NECK: supple,no adenopathy,no bruits  CHEST: no chest tenderness  LUNGS: clear to auscultation  CARDIO: nl s1 and s2, RRR without murmur  GI: good BS's,no masses, HSM or tenderness  BREAST: Deferred. To be done at gyne.    GENITAL/URINARY:  Deferred. To be done at gyne.    MUSCULOSKELETAL: back is not tender,FROM of the back  EXTREMITIES: no cyanosis, clubbing or edema  NEURO: Oriented times three,cranial nerves are intact,motor and sensory are grossly intact    Labs:   Lab Results   Component Value  Date/Time    WBC 6.6 02/10/2025 09:17 AM    HGB 11.9 02/10/2025 09:17 AM     02/10/2025 09:17 AM      Lab Results   Component Value Date/Time    GLU 95 02/10/2025 09:17 AM     02/10/2025 09:17 AM    K 4.3 02/10/2025 09:17 AM     02/10/2025 09:17 AM    CO2 27 02/10/2025 09:17 AM    CREATSERUM 0.60 02/10/2025 09:17 AM    CA 9.1 02/10/2025 09:17 AM    ALB 4.0 02/10/2025 09:17 AM    TP 6.4 02/10/2025 09:17 AM    ALKPHO 82 02/10/2025 09:17 AM    AST 12 02/10/2025 09:17 AM    ALT 13 02/10/2025 09:17 AM    BILT 0.6 02/10/2025 09:17 AM    TSH 0.727 01/25/2022 01:35 PM    T4F 1.52 01/25/2022 01:35 PM        Lab Results   Component Value Date/Time    CHOLEST 142 02/10/2025 09:17 AM    HDL 71 02/10/2025 09:17 AM    TRIG 84 02/10/2025 09:17 AM    LDL 55 02/10/2025 09:17 AM    NONHDLC 71 02/10/2025 09:17 AM       Lab Results   Component Value Date/Time    A1C 5.8 (H) 02/10/2025 09:17 AM      Vitamin D:    Lab Results   Component Value Date    VITD 62 02/10/2025           ASSESSMENT AND PLAN:   Jolie Orellana is a 59 year old female who presents for a complete physical exam.     1. Physical exam, annual  See gyne as planned.   Colonoscopy up to date.   Immunizations discussed.   Continue regular exercise.     2. Essential hypertension  Continue meds.   On lisinopril 10mg daily.     3. Hyperlipidemia, unspecified hyperlipidemia type  Continue statin.   - CMP in 6 months  - Lipid in 6 months  - CBC W Differential W Platelet [E]    4. Prediabetes  Continue metformin.   - Hemoglobin A1C in 6 months    5. Mild intermittent asthma without complication (HCC)  Continue current inhalers.     6. Calculus of gallbladder without cholecystitis without obstruction  No symptoms.   Monitor.     7. Postablative hypothyroidism  follow up with endocrine.     8. NICOLAS on CPAP  Continue cpap.       Return in about 6 months (around 8/18/2025) for med check.      Carol [facility] MD

## 2025-02-18 ENCOUNTER — OFFICE VISIT (OUTPATIENT)
Dept: INTERNAL MEDICINE CLINIC | Facility: CLINIC | Age: 60
End: 2025-02-18
Payer: COMMERCIAL

## 2025-02-18 VITALS
TEMPERATURE: 98 F | BODY MASS INDEX: 34.49 KG/M2 | RESPIRATION RATE: 16 BRPM | HEIGHT: 64.5 IN | SYSTOLIC BLOOD PRESSURE: 120 MMHG | DIASTOLIC BLOOD PRESSURE: 66 MMHG | HEART RATE: 76 BPM | WEIGHT: 204.5 LBS

## 2025-02-18 DIAGNOSIS — Z00.00 PHYSICAL EXAM, ANNUAL: Primary | ICD-10-CM

## 2025-02-18 DIAGNOSIS — J45.20 MILD INTERMITTENT ASTHMA WITHOUT COMPLICATION (HCC): ICD-10-CM

## 2025-02-18 DIAGNOSIS — K80.20 CALCULUS OF GALLBLADDER WITHOUT CHOLECYSTITIS WITHOUT OBSTRUCTION: ICD-10-CM

## 2025-02-18 DIAGNOSIS — E78.5 HYPERLIPIDEMIA, UNSPECIFIED HYPERLIPIDEMIA TYPE: ICD-10-CM

## 2025-02-18 DIAGNOSIS — I10 ESSENTIAL HYPERTENSION: ICD-10-CM

## 2025-02-18 DIAGNOSIS — R73.03 PREDIABETES: ICD-10-CM

## 2025-02-18 DIAGNOSIS — G47.33 OSA ON CPAP: ICD-10-CM

## 2025-02-18 DIAGNOSIS — E89.0 POSTABLATIVE HYPOTHYROIDISM: ICD-10-CM

## 2025-02-18 PROCEDURE — 99396 PREV VISIT EST AGE 40-64: CPT | Performed by: INTERNAL MEDICINE

## 2025-02-18 PROCEDURE — 99214 OFFICE O/P EST MOD 30 MIN: CPT | Performed by: INTERNAL MEDICINE

## 2025-03-12 DIAGNOSIS — R73.03 PREDIABETES: ICD-10-CM

## 2025-03-13 RX ORDER — METFORMIN HYDROCHLORIDE 500 MG/1
500 TABLET, EXTENDED RELEASE ORAL
Qty: 90 TABLET | Refills: 1 | Status: SHIPPED | OUTPATIENT
Start: 2025-03-13

## 2025-03-13 NOTE — TELEPHONE ENCOUNTER
Diabetes Medication Protocol Qluacb4803/12/2025 12:22 AM   Protocol Details Last A1C < 7.5 and within past 6 months    In person appointment or virtual visit in the past 6 mos or appointment in next 3 mos    Microalbumin procedure in past 12 months or taking ACE/ARB    EGFRCR or GFRNAA > 50    GFR in the past 12 months    Medication is active on med list      4. Prediabetes  Continue metformin.   Future Appointments   Date Time Provider Department Center   8/19/2025 10:40 AM Carol [facility] MD [facility] [place]   12/1/2025  1:00 PM Samina Trinh APRN [facility]

## 2025-04-17 DIAGNOSIS — I10 ESSENTIAL HYPERTENSION: ICD-10-CM

## 2025-04-17 RX ORDER — LISINOPRIL 10 MG/1
10 TABLET ORAL DAILY
Qty: 90 TABLET | Refills: 0 | Status: SHIPPED | OUTPATIENT
Start: 2025-04-17

## 2025-04-17 NOTE — TELEPHONE ENCOUNTER
Hypertension Medications Protocol Mohxep1804/17/2025 12:24 AM   Protocol Details CMP or BMP in past 12 months    Last BP reading less than 140/90    In person appointment or virtual visit in the past 12 mos or appointment in next 3 mos    EGFRCR or GFRNAA > 50    Medication is active on med list        2. Essential hypertension  Continue meds.   On lisinopril 10mg daily.   Future Appointments   Date Time Provider Department Center   8/19/2025 10:40 AM Carol [facility] MD [facility] [place]   12/1/2025  1:00 PM Samina Trinh APRN [facility]

## 2025-05-29 DIAGNOSIS — B00.1 COLD SORE: ICD-10-CM

## 2025-05-29 RX ORDER — VALACYCLOVIR HYDROCHLORIDE 1 G/1
2000 TABLET, FILM COATED ORAL EVERY 12 HOURS SCHEDULED
Qty: 4 TABLET | Refills: 0 | Status: SHIPPED | OUTPATIENT
Start: 2025-05-29 | End: 2025-05-30

## 2025-05-29 NOTE — TELEPHONE ENCOUNTER
Is this medication prescribed by the [facility] Providers? yes    Did the patient contact the pharmacy directly?:  no    Is patient out of meds or supply very low?:  0    Medication Requested:  Valacyclovir    Dose:      Is patient requesting a 30 or 90 day supply?:      Pharmacy name and phone # or location:  [facility] #[id number] - [address] AT INTERSECTION OF [address], [phone], [phone]     Is the patient due for an appointment?: no  (if so, please schedule appt)    Additional Notes:      Please advise the patient refills take up to 72 business hours.

## 2025-05-29 NOTE — TELEPHONE ENCOUNTER
Last OV relevant to medication: physical 2/18/25  Last refill date: 11/6/24 4     #/refills: 1  When pt was asked to return for OV: 6 months   Upcoming appt/reason:   Future Appointments   Date Time Provider Department Center   8/19/2025 10:40 AM Carol [facility] MD [facility] [place]   12/1/2025  1:00 PM Last, MELISSA Haas [facility]       Was pt informed of any over due labs: due in August   Lab Results   Component Value Date    GLU 95 02/10/2025    BUN 17 02/10/2025    BUNCREA SEE NOTE: 02/10/2025    CREATSERUM 0.60 02/10/2025    ANIONGAP 5 07/21/2020     11/16/2017    GFRNAA 95 05/24/2022    GFRAA 110 05/24/2022    CA 9.1 02/10/2025    OSMOCALC 291 07/21/2020    ALKPHO 82 02/10/2025    AST 12 02/10/2025    ALT 13 02/10/2025    BILT 0.6 02/10/2025    TP 6.4 02/10/2025    ALB 4.0 02/10/2025    GLOBULIN 2.4 02/10/2025    AGRATIO 1.7 02/10/2025     02/10/2025    K 4.3 02/10/2025     02/10/2025    CO2 27 02/10/2025

## 2025-06-03 DIAGNOSIS — E78.5 HYPERLIPIDEMIA, UNSPECIFIED HYPERLIPIDEMIA TYPE: ICD-10-CM

## 2025-06-04 RX ORDER — ROSUVASTATIN CALCIUM 10 MG/1
10 TABLET, COATED ORAL NIGHTLY
Qty: 90 TABLET | Refills: 0 | Status: SHIPPED | OUTPATIENT
Start: 2025-06-04

## 2025-06-04 NOTE — TELEPHONE ENCOUNTER
Cholesterol Medication Protocol Cvdulo0706/03/2025 10:22 AM   Protocol Details ALT < 80    ALT resulted within past year    Lipid panel within past 12 months    In person appointment or virtual visit in the past 12 mos or appointment in next 3 mos    Medication is active on med list      3. Hyperlipidemia, unspecified hyperlipidemia type  Continue statin.   Future Appointments   Date Time Provider Department Center   8/19/2025 10:40 AM Carol [facility] MD [facility] [place]   12/1/2025  1:00 PM Samina Trinh APRN [facility]

## 2025-06-23 ENCOUNTER — APPOINTMENT (OUTPATIENT)
Dept: GENERAL RADIOLOGY | Age: 60
End: 2025-06-23
Attending: EMERGENCY MEDICINE
Payer: COMMERCIAL

## 2025-06-23 ENCOUNTER — HOSPITAL ENCOUNTER (OUTPATIENT)
Age: 60
Discharge: HOME | End: 2025-06-23
Attending: EMERGENCY MEDICINE
Payer: COMMERCIAL

## 2025-06-23 VITALS
TEMPERATURE: 98 F | DIASTOLIC BLOOD PRESSURE: 60 MMHG | RESPIRATION RATE: 18 BRPM | WEIGHT: 200 LBS | OXYGEN SATURATION: 98 % | SYSTOLIC BLOOD PRESSURE: 143 MMHG | HEIGHT: 66 IN | BODY MASS INDEX: 32.14 KG/M2 | HEART RATE: 62 BPM

## 2025-06-23 DIAGNOSIS — R07.89 CHEST WALL PAIN: Primary | ICD-10-CM

## 2025-06-23 LAB
#MXD IC: 0.3 X10ˆ3/UL (ref 0.1–1)
ATRIAL RATE: 60 BPM
BUN BLD-MCNC: 13 MG/DL (ref 7–18)
CHLORIDE BLD-SCNC: 103 MMOL/L (ref 98–112)
CO2 BLD-SCNC: 24 MMOL/L (ref 21–32)
CREAT BLD-MCNC: 0.7 MG/DL (ref 0.55–1.02)
EGFRCR SERPLBLD CKD-EPI 2021: 100 ML/MIN/1.73M2 (ref 60–?)
GLUCOSE BLD-MCNC: 104 MG/DL (ref 70–99)
HCT VFR BLD AUTO: 37.6 % (ref 35–48)
HCT VFR BLD CALC: 37 % (ref 34–50)
HGB BLD-MCNC: 11.8 G/DL (ref 12–16)
ISTAT IONIZED CALCIUM FOR CHEM 8: 1.18 MMOL/L (ref 1.12–1.32)
LYMPHOCYTES # BLD AUTO: 1.9 X10ˆ3/UL (ref 1–4)
LYMPHOCYTES NFR BLD AUTO: 29 %
MCH RBC QN AUTO: 29.5 PG (ref 26–34)
MCHC RBC AUTO-ENTMCNC: 31.4 G/DL (ref 31–37)
MCV RBC AUTO: 94 FL (ref 80–100)
MIXED CELL %: 3.9 %
NEUTROPHILS # BLD AUTO: 4.3 X10ˆ3/UL (ref 1.5–7.7)
NEUTROPHILS NFR BLD AUTO: 67.1 %
P AXIS: 74 DEGREES
P-R INTERVAL: 150 MS
PLATELET # BLD AUTO: 235 X10ˆ3/UL (ref 150–450)
POTASSIUM BLD-SCNC: 3.9 MMOL/L (ref 3.6–5.1)
Q-T INTERVAL: 414 MS
QRS DURATION: 84 MS
QTC CALCULATION (BEZET): 414 MS
R AXIS: 71 DEGREES
RBC # BLD AUTO: 4 X10ˆ6/UL (ref 3.8–5.3)
SODIUM BLD-SCNC: 141 MMOL/L (ref 136–145)
T AXIS: 48 DEGREES
TROPONIN I BLD-MCNC: <0.02 NG/ML (ref ?–0.05)
VENTRICULAR RATE: 60 BPM
WBC # BLD AUTO: 6.5 X10ˆ3/UL (ref 4–11)

## 2025-06-23 PROCEDURE — 93005 ELECTROCARDIOGRAM TRACING: CPT

## 2025-06-23 PROCEDURE — 36415 COLL VENOUS BLD VENIPUNCTURE: CPT

## 2025-06-23 PROCEDURE — 99214 OFFICE O/P EST MOD 30 MIN: CPT

## 2025-06-23 PROCEDURE — 85025 COMPLETE CBC W/AUTO DIFF WBC: CPT | Performed by: EMERGENCY MEDICINE

## 2025-06-23 PROCEDURE — 93010 ELECTROCARDIOGRAM REPORT: CPT

## 2025-06-23 PROCEDURE — 99215 OFFICE O/P EST HI 40 MIN: CPT

## 2025-06-23 PROCEDURE — 71046 X-RAY EXAM CHEST 2 VIEWS: CPT | Performed by: EMERGENCY MEDICINE

## 2025-06-23 PROCEDURE — 80047 BASIC METABLC PNL IONIZED CA: CPT

## 2025-06-23 PROCEDURE — 84484 ASSAY OF TROPONIN QUANT: CPT

## 2025-06-23 NOTE — DISCHARGE INSTRUCTIONS
Follow-up for further evaluation primary physician.  Call for appointment.  Ibuprofen 600 mg 3 times a day with food, rest.  Activity as tolerated.  Go to the emergency department if new or worse symptoms.

## 2025-06-23 NOTE — ED PROVIDER NOTES
Patient Seen in: [facility]        History  No chief complaint on file.    Stated Complaint: Chest Pain    Subjective:   HPI    Patient is a 59-year-old female who states last 5 or 6 days she has had left-sided chest pectoral area discomfort.  Patient states she has been doing increased lifting moving lately and feels the pain is worse when she moves or certain positions.  Patient states that is fine at rest.  Patient [place] any radiation.  No shortness of breath, no nausea vomiting, no diaphoresis.  No chest pain with exertion.  No previous coronary disease.  No abdominal pain, no vomiting diarrhea.  Remainder of review of systems negative.      Objective:     No pertinent past medical history.        Hypertension,  high cholesterol, no diabetes, no previous coronary disease.    No pertinent past surgical history.              No pertinent social history.        Former smoker quit 15 years ago no alcohol.    Family history.  Patient is adopted.  Unknown     Review of Systems    Positive for stated complaint: Chest Pain  Other systems are as noted in HPI.  Constitutional and vital signs reviewed.      All other systems reviewed and negative except as noted above.                  Physical Exam    ED Triage Vitals [06/23/25 1030]   /60   Pulse 62   Resp 18   Temp 97.6 °F (36.4 °C)   Temp src Oral   SpO2 98 %   O2 Device None (Room air)       Current Vitals:   Vital Signs  BP: 143/60  Pulse: 62  Resp: 18  Temp: 97.6 °F (36.4 °C)  Temp src: Oral    Oxygen Therapy  SpO2: 98 %  O2 Device: None (Room air)            Physical Exam   GENERAL: Patient resting comfortably on the cart in no acute distress.  HEENT: Extraocular muscles intact,   LUNGS: Lungs clear to auscultation bilaterally.  Tenderness left pectoral region.  No erythema warmth or swelling.  Worse with pushing or pulling with the left arm.  CARDIOVASCULAR: + S1-S2, regular rate and rhythm, no murmurs.  BACK: No CVA tenderness, no midline  bony tenderness.  ABDOMEN: + Bowel sounds, soft, nontender, nondistended.  No rebound, no guarding, no hepatosplenomegaly.  EXTREMITIES: Full range of motion, no tenderness, good capillary refill.  No calf tenderness or edema  SKIN: No rash, good turgor.  NEURO: Patient answers questions appropriately.  No focal deficits appreciated.            ED Course  Labs Reviewed   POCT CBC - Abnormal; Notable for the following components:       Result Value    HGB IC 11.8 (*)     All other components within normal limits   POCT ISTAT CHEM8 CARTRIDGE - Abnormal; Notable for the following components:    ISTAT Glucose 104 (*)     All other components within normal limits   ISTAT TROPONIN - Normal     EKG    Rate, intervals and axes as noted on EKG Report.  Rate: 60  Rhythm: Sinus Rhythm  Reading: Normal sinus rhythm, no acute changes              Chest x-ray Air trapping.  No consolidation.   Independent reviewed by myself, no pneumothorax                  MDM     Patient declined pain medicines.  Patient initially declined EKG further testing but was agreeable after discussion.  EKG unremarkable troponin negative.  Patient does have reproducible pain to left chest pec region worse with pushing and moving.  Recommend follow-up evaluation with primary physician, go to the emergency department if chest pressure, shortness of breath, left arm discomfort, new or worse symptoms.  I did consider ACS, chest wall strain.        Medical Decision Making      Disposition and Plan     Clinical Impression:  1. Chest wall pain         Disposition:  Discharge  6/23/2025 11:49 am    Follow-up:  Carol Ellsworth MD  [address] [address]  [phone]    In 2 days            Medications Prescribed:  Discharge Medication List as of 6/23/2025 11:50 AM                Supplementary Documentation:

## 2025-06-30 ENCOUNTER — PATIENT MESSAGE (OUTPATIENT)
Facility: CLINIC | Age: 60
End: 2025-06-30

## 2025-06-30 ENCOUNTER — TELEPHONE (OUTPATIENT)
Dept: ORTHOPEDICS CLINIC | Facility: CLINIC | Age: 60
End: 2025-06-30

## 2025-06-30 NOTE — TELEPHONE ENCOUNTER
Patient calling requesting to be seen tomorrow for an inj with pj due to her leaving on vacation soon. Please advise if tomorrow is possible. She would like a call back

## 2025-07-01 ENCOUNTER — OFFICE VISIT (OUTPATIENT)
Facility: CLINIC | Age: 60
End: 2025-07-01
Payer: COMMERCIAL

## 2025-07-01 DIAGNOSIS — M17.12 PRIMARY OSTEOARTHRITIS OF LEFT KNEE: Primary | ICD-10-CM

## 2025-07-01 PROCEDURE — 20610 DRAIN/INJ JOINT/BURSA W/O US: CPT | Performed by: PHYSICIAN ASSISTANT

## 2025-07-01 RX ORDER — TRIAMCINOLONE ACETONIDE 40 MG/ML
40 INJECTION, SUSPENSION INTRA-ARTICULAR; INTRAMUSCULAR ONCE
Status: COMPLETED | OUTPATIENT
Start: 2025-07-01 | End: 2025-07-01

## 2025-07-01 RX ADMIN — TRIAMCINOLONE ACETONIDE 40 MG: 40 INJECTION, SUSPENSION INTRA-ARTICULAR; INTRAMUSCULAR at 09:10:00

## 2025-07-01 NOTE — PROCEDURES
Risks and benefits of knee injection discussed with the patient, with risks including but not limited to pain and swelling at the injection site and/or within the knee joint, infection, elevation in blood pressure and/or glucose levels, facial flushing. After informed consent, the patient's left knee was marked, locally anesthetized with skin refrigerant, prepped with topical antiseptic, and injected with a mixture of 1mL 40mg/mL Kenalog, 2mL 1% lidocaine and 2mL 0.5% marcaine through the inferolateral portal.  A band-aid was applied.  The patient tolerated the procedure well.    Francois Rowe PA-C  [facility]

## 2025-07-16 DIAGNOSIS — I10 ESSENTIAL HYPERTENSION: ICD-10-CM

## 2025-07-16 RX ORDER — LISINOPRIL 10 MG/1
10 TABLET ORAL DAILY
Qty: 90 TABLET | Refills: 1 | Status: SHIPPED | OUTPATIENT
Start: 2025-07-16

## 2025-07-16 NOTE — TELEPHONE ENCOUNTER
Last OV relevant to medication: 2/18   Last refill date: 4/17     #/refills: 90/0   When pt was asked to return for OV: Return in about 6 months (around 8/18/2025) for med chec    Upcoming appt/reason:   Future Appointments   Date Time Provider Department Center   8/19/2025 10:40 AM Carol Ell[facility] MD [facility] [place]   12/1/2025  1:00 PM Last, MELISSA Haas [facility]       Was pt informed of any over due labs: DUE next month     Lab Results   Component Value Date    GLU 95 02/10/2025    BUN 17 02/10/2025    BUNCREA SEE NOTE: 02/10/2025    CREATSERUM 0.60 02/10/2025    ANIONGAP 5 07/21/2020     11/16/2017    GFRNAA 95 05/24/2022    GFRAA 110 05/24/2022    CA 9.1 02/10/2025    OSMOCALC 291 07/21/2020    ALKPHO 82 02/10/2025    AST 12 02/10/2025    ALT 13 02/10/2025    BILT 0.6 02/10/2025    TP 6.4 02/10/2025    ALB 4.0 02/10/2025    GLOBULIN 2.4 02/10/2025    AGRATIO 1.7 02/10/2025     02/10/2025    K 4.3 02/10/2025     02/10/2025    CO2 27 02/10/2025

## 2025-08-12 ENCOUNTER — TELEPHONE (OUTPATIENT)
Dept: ORTHOPEDICS CLINIC | Facility: CLINIC | Age: 60
End: 2025-08-12

## 2025-08-12 ENCOUNTER — OFFICE VISIT (OUTPATIENT)
Facility: CLINIC | Age: 60
End: 2025-08-12

## 2025-08-12 DIAGNOSIS — M17.12 PRIMARY OSTEOARTHRITIS OF LEFT KNEE: Primary | ICD-10-CM

## 2025-08-12 PROCEDURE — 99213 OFFICE O/P EST LOW 20 MIN: CPT | Performed by: PHYSICIAN ASSISTANT

## 2025-08-12 RX ORDER — METHYLPREDNISOLONE 4 MG/1
TABLET ORAL
Qty: 21 EACH | Refills: 0 | Status: SHIPPED | OUTPATIENT
Start: 2025-08-12

## 2025-08-19 ENCOUNTER — OFFICE VISIT (OUTPATIENT)
Facility: CLINIC | Age: 60
End: 2025-08-19

## 2025-08-19 ENCOUNTER — HOSPITAL ENCOUNTER (OUTPATIENT)
Dept: GENERAL RADIOLOGY | Age: 60
Discharge: HOME | End: 2025-08-19
Attending: PHYSICIAN ASSISTANT

## 2025-08-19 DIAGNOSIS — M17.12 PRIMARY OSTEOARTHRITIS OF LEFT KNEE: ICD-10-CM

## 2025-08-19 DIAGNOSIS — M25.562 LEFT KNEE PAIN, UNSPECIFIED CHRONICITY: Primary | ICD-10-CM

## 2025-08-19 DIAGNOSIS — M25.562 LEFT KNEE PAIN, UNSPECIFIED CHRONICITY: ICD-10-CM

## 2025-08-19 LAB
ABSOLUTE BASOPHILS: 64 CELLS/UL (ref 0–200)
ABSOLUTE EOSINOPHILS: 118 CELLS/UL (ref 15–500)
ABSOLUTE LYMPHOCYTES: 3540 CELLS/UL (ref 850–3900)
ABSOLUTE MONOCYTES: 400 CELLS/UL (ref 200–950)
ABSOLUTE NEUTROPHILS: 4978 CELLS/UL (ref 1500–7800)
ALBUMIN/GLOBULIN RATIO: 1.6 (CALC) (ref 1–2.5)
ALBUMIN: 4 G/DL (ref 3.6–5.1)
ALKALINE PHOSPHATASE: 80 U/L (ref 37–153)
ALT: 12 U/L (ref 6–29)
AST: 12 U/L (ref 10–35)
BASOPHILS: 0.7 %
BILIRUBIN, TOTAL: 0.6 MG/DL (ref 0.2–1.2)
BUN: 21 MG/DL (ref 7–25)
CALCIUM: 9.1 MG/DL (ref 8.6–10.4)
CARBON DIOXIDE: 30 MMOL/L (ref 20–32)
CHLORIDE: 102 MMOL/L (ref 98–110)
CHOL/HDLC RATIO: 2 (CALC)
CHOLESTEROL, TOTAL: 152 MG/DL
CREATININE: 0.71 MG/DL (ref 0.5–1.03)
EGFR: 98 ML/MIN/1.73M2
EOSINOPHILS: 1.3 %
GLOBULIN: 2.5 G/DL (CALC) (ref 1.9–3.7)
GLUCOSE: 80 MG/DL (ref 65–99)
HDL CHOLESTEROL: 75 MG/DL
HEMATOCRIT: 40.7 % (ref 35–45)
HEMOGLOBIN A1C: 5.9 %
HEMOGLOBIN: 13.3 G/DL (ref 11.7–15.5)
LDL-CHOLESTEROL: 56 MG/DL (CALC)
LYMPHOCYTES: 38.9 %
MCH: 31.2 PG (ref 27–33)
MCHC: 32.7 G/DL (ref 32–36)
MCV: 95.5 FL (ref 80–100)
MONOCYTES: 4.4 %
MPV: 10.5 FL (ref 7.5–12.5)
NEUTROPHILS: 54.7 %
NON-HDL CHOLESTEROL: 77 MG/DL (CALC)
PLATELET COUNT: 319 THOUSAND/UL (ref 140–400)
POTASSIUM: 4.5 MMOL/L (ref 3.5–5.3)
PROTEIN, TOTAL: 6.5 G/DL (ref 6.1–8.1)
RDW: 13 % (ref 11–15)
RED BLOOD CELL COUNT: 4.26 MILLION/UL (ref 3.8–5.1)
SODIUM: 140 MMOL/L (ref 135–146)
TRIGLYCERIDES: 126 MG/DL
WHITE BLOOD CELL COUNT: 9.1 THOUSAND/UL (ref 3.8–10.8)

## 2025-08-19 PROCEDURE — 20610 DRAIN/INJ JOINT/BURSA W/O US: CPT | Performed by: PHYSICIAN ASSISTANT

## 2025-08-19 PROCEDURE — 73564 X-RAY EXAM KNEE 4 OR MORE: CPT | Performed by: PHYSICIAN ASSISTANT

## 2025-08-19 PROCEDURE — 99213 OFFICE O/P EST LOW 20 MIN: CPT | Performed by: PHYSICIAN ASSISTANT

## (undated) DIAGNOSIS — R73.03 PREDIABETES: ICD-10-CM

## (undated) NOTE — LETTER
ASTHMA ACTION PLAN for Danni Smith     : 10/30/1965     Date: 2017  Provider:   Zach Ochoa DO  Phone for doctor or clinic: [address]  [facility] [address] [facility] [phone]

## (undated) NOTE — LETTER
[facility] EMERGENCY DEPARTMENT IN [facility]  [phone]          Patient: Rosio Rajan   YOB: 1965   Date of Visit: 7/21/2020       Dear Employer,         July 21, 2020    At [place] [address] it is at all feasible for them to do so. COVID-19 is especially risky for high-risk patients (including, but not limited to, age over 61, immunosuppressed status due to disease or medication, chronic respiratory or heart conditions and diabetes).     · Johana Toro

## (undated) NOTE — LETTER
Date & Time: 7/21/2020, 8:37 PM  Patient: Lucinda Zapata  Encounter Provider(s):    MD Dylan Wong Parents, APRN       To Whom It May Concern:    Jaciel Murillo was seen and treated in our department on 7/21/2020.  She should not return to work u

## (undated) NOTE — LETTER
WILLIAM [address], [address] [place] [address] [address] [id number]  [facility]: [phone]  FAX: [phone]     Date:  2023     Patient:  Vinayak Quiroz   10/30/1965        To Whom it may concern: This letter has been written at the patient's request. The above patient was seen at the [facility] for treatment of covid. This patient should be excused from attending work from 23 through 10/1/23. Patient garth Meierjose a to return on Naveen 10/1/23 per CDC guidelines if feeling better and fever free for 24     hours with a mask.          Sincerely,    Jorge Luis Anderson NP

## (undated) NOTE — MR AVS SNAPSHOT
After Visit Summary   11/22/2021    Elsy Duke   MRN: [id number]           Visit Information     Date & Time  11/22/2021 11:30 AM Provider  Master Agarwal MD Department  [place] [address], Lori Beyer Dept.  Phone  [phone]      Your Vit B Complex Vitamins (VITAMIN B-COMPLEX OR) Take 1 Dose by mouth daily.     ALBUTEROL SULFATE  (90 Base) MCG/ACT Inhalation Aero Soln INHALE 1-2 PUFFS INTO THE LUNGS EVERY 6 (SIX) HOURS AS NEEDED FOR WHEEZING OR SHORTNESS OF BREATH    NON [address] credit, debit, or health savings card. Not active on yuback? Ask us how to get signed up today! If you receive a survey from [employer], please take a few minutes to complete it and provide feedback.  We strive to deliver the best patient [facility]

## (undated) NOTE — MR AVS SNAPSHOT
After Visit Summary   9/30/2020    Tonia Campos    MRN: [id number]           Visit Information     Date & Time  9/30/2020  1:15 PM Provider  Micky Doss, [facility] Sharmaine Phipps, [facility] Briana Mcmullen Dept.  Phone  [phone]      Your Juani Albuterol Sulfate HFA (PROAIR HFA) 108 (90 Base) MCG/ACT Inhalation Aero Soln INHALE 1-2 PUFFS INTO THE LUNGS EVERY 6 (SIX) HOURS AS NEEDED FOR WHEEZING OR SHORTNESS OF BREATH.     SYNTHROID 125 MCG Oral Tab TAKE 1 TABLET DAILY    LISINOPRIL 10 MG Oral Tab Instructions: To schedule an appointment for your radiology test please call [address] Scheduling at [phone]. Around September 30, 2020   Imaging:   KYLE SCREENING RIGHT ([id number])    Instructions:  Your order will generate a \" Don’t forget strength training with weights and resistance Set goals and track your progress   You don’t need to join a gym. Home exercises work great.  Put more priority on exercise in your life           DM now offers Video Visits through [address] for a Average cost  $70*       [facility] – [address]   Monday – Friday  10:00 am – 10:00 pm   Saturday – Sunday  10:00 am – 4:00 pm     [address]   Monday – Friday  4:00 pm – 10:00 pm   Tiki

## (undated) NOTE — LETTER
ASTHMA ACTION PLAN for Dave Neff     : 10/30/1965     Date: 2022  Provider:  Nico Venegas MD  Phone for doctor or clinic: [facility],  [address] [facility], [place]  [address] [facility] [address] [id number]  [phone]    ACT Score: 25      You can use the colors of a traffic light to help learn about your asthma medicines. 1. Green - Go! % of Personal Best Peak Flow Use controller medicine. Breathing is good  No cough or wheeze  Can work and play Medicine How much to take When to take it    No daily maintenance medications      2. Yellow - Caution. 50-79% Personal Best Peak  Flow. Use reliever medicine to keep an asthma attack from getting bad. Cough  Wheezing  Tight Chest  Wake up at night Medicine How much to take When to take it    Albuterol Inhaler. Inhale 2 puffs every 6 hours as needed  Schedule office visit with PCP       Additional instructions         3. Red - Stop! Danger!  <50% Personal Best Peak  Flow. Take these medications until  Get help from a doctor   Medicine not helping  Breathing is hard and fast  Nose opens wide  Can't walk  Ribs show  Can't talk well Medicine How much to take When to take it    Call 911 or go to the ER  Do not drive yourself. Additional Instructions If your symptoms do not improve and you cannot contact your doctor, [facility] or call 911 immediately! [x] Asthma Action Plan reviewed with patient (and caregiver if necessary) and a copy of the plan was given to the patient/caregiver. [] Asthma Action Plan reviewed with patient (and caregiver if necessary) on the phone and mailed copy to patient or submitted via [address]      Signatures:  Provider  Nico Venegas MD   Patient Caretaker

## (undated) NOTE — MR AVS SNAPSHOT
After Visit Summary   9/25/2019    Danni Smith    MRN: [id number]           Visit Information     Date & Time  9/25/2019  1:15 PM Provider  Meredith Moses MD Department  [place] [address], [facility], [facility]  Phone  [phone]      Your Vi Encounter for annual routine gynecological examination   [[id number]]  -  Primary  Screening for malignant neoplasm of cervix   [[id number]]    Visit for screening mammogram   [[id number]]             We Ordered the Following     Normal Orders This Visit    HPV HIGH Injury & illness are never convenient. If you are dealing with a   non-emergency, consider your options before heading to an ER.          SAME DAY  APPOINTMENTS  Available at primary care offices      [address] [facility]

## (undated) NOTE — LETTER
ASTHMA ACTION PLAN for Claudette Lindo     : 10/30/1965     Date: 2023  Provider:  Som Friend MD  Phone for doctor or clinic: [facility], [address] [facility], [place]  [address] [facility] [place] [id number] [phone]    ACT Score: 22      You can use the colors of a traffic light to help learn about your asthma medicines. 1. Green - Go! % of Personal Best Peak Flow Use controller medicine. Breathing is good  No cough or wheeze  Can work and play Medicine How much to take When to take it    No daily maintenance medications      2. Yellow - Caution. 50-79% Personal Best Peak  Flow. Use reliever medicine to keep an asthma attack from getting bad. Cough  Wheezing  Tight Chest  Wake up at night Medicine How much to take When to take it    Albuterol Inhaler. Inhale 2 puffs every 6 hours as needed  Schedule office visit with PCP       Additional instructions         3. Red - Stop! Danger!  <50% Personal Best Peak  Flow. Take these medications until  Get help from a doctor   Medicine not helping  Breathing is hard and fast  Nose opens wide  Can't walk  Ribs show  Can't talk well Medicine How much to take When to take it    Call 911 or go to the ER  Do not drive yourself. Additional Instructions If your symptoms do not improve and you cannot contact your doctor, [facility] or call 911 immediately! [x] Asthma Action Plan reviewed with patient (and caregiver if necessary) and a copy of the plan was given to the patient/caregiver. [] Asthma Action Plan reviewed with patient (and caregiver if necessary) on the phone and mailed copy to patient or submitted via [address]      Signatures:  Provider  Som Friend MD   Patient Caretaker

## (undated) NOTE — LETTER
ASTHMA ACTION PLAN for Chi No     : 10/30/1965     Date: 2021  Provider:  Sulema Perez MD  Phone for doctor or clinic: Joanne Mack, [address] [place] [address] [address] [facility] Sh[address] Wood [phone]

## (undated) NOTE — ED AVS SNAPSHOT
Teodora Alba   MRN: [id number]    Department:  THE [facility] Emergency Department in [place]   Date of Visit:  12/30/2018           Disclosure     Insurance plans vary and the physician(s) referred by the ER may not be covered by your plan.  Please contac tell this physician (or your personal doctor if your instructions are to return to your personal doctor) about any new or lasting problems. The primary care or specialist physician will see patients referred from the [facility] Emergency Department.  Severo Pastures

## (undated) NOTE — LETTER
ASTHMA ACTION PLAN for Jolie Orellana     : 10/30/1965     Date: 2024  Provider:  Carol Ellsworth MD  Phone for doctor or clinic: [facility], [address] [facility], [place]  [address]  [facility] [id number] [phone]    ACT Score: 23      You can use the colors of a traffic light to help learn about your asthma medicines.      1. Green - Go! % of Personal Best Peak Flow Use controller medicine.   Breathing is good  No cough or wheeze  Can work and play Medicine How much to take When to take it    No daily maintenance medications      2. Yellow - Caution. 50-79% Personal Best Peak  Flow.  Use reliever medicine to keep an asthma attack from getting bad.   Cough  Wheezing  Tight Chest  Wake up at night Medicine How much to take When to take it    Albuterol Inhaler. Inhale 1-2 puffs every 6 hours as needed  Schedule office visit with PCP       Additional instructions         3. Red - Stop! Danger!  <50% Personal Best Peak  Flow. Take these medications until  Get help from a doctor   Medicine not helping  Breathing is hard and fast  Nose opens wide  Can't walk  Ribs show  Can't talk well Medicine How much to take When to take it    Call 911 or go to the ER  Do not drive yourself.      Additional Instructions If your symptoms do not improve and you cannot contact your doctor, [facility] or call 911 immediately!     [x] Asthma Action Plan reviewed with patient (and caregiver if necessary) and a copy of the plan was given to the patient/caregiver.   [] Asthma Action Plan reviewed with patient (and caregiver if necessary) on the phone and mailed copy to patient or submitted via [employer].     Signatures:  Provider  Carol Ellsworth MD   Patient Caretaker

## (undated) NOTE — LETTER
ASTHMA ACTION PLAN for Jolie Orellana     : 10/30/1965     Date: 2025  Provider:  Carol Ellsworth MD  Phone for doctor or clinic: [facility], [address] [facility], [place], [place]  [address]  [facility] [id number] [phone]    ACT Score: 25      You can use the colors of a traffic light to help learn about your asthma medicines.      1. Green - Go! % of Personal Best Peak Flow Use controller medicine.   Breathing is good  No cough or wheeze  Can work and play Medicine How much to take When to take it    No daily maintenance medications      2. Yellow - Caution. 50-79% Personal Best Peak  Flow.  Use reliever medicine to keep an asthma attack from getting bad.   Cough  Wheezing  Tight Chest  Wake up at night Medicine How much to take When to take it    Albuterol Inhaler. Inhale 1-2 puffs every 6hours as needed  Albuterol Neb. Inhale 3ml by nebulization every 6 hours as needed  Schedule office visit with PCP       Additional instructions         3. Red - Stop! Danger!  <50% Personal Best Peak  Flow. Take these medications until  Get help from a doctor   Medicine not helping  Breathing is hard and fast  Nose opens wide  Can't walk  Ribs show  Can't talk well Medicine How much to take When to take it    Call 911 or go to the ER  Do not drive yourself.     Additional Instructions If your symptoms do not improve and you cannot contact your doctor, [facility] or call 911 immediately!     [x] Asthma Action Plan reviewed with patient (and caregiver if necessary) and a copy of the plan was given to the patient/caregiver.   [] Asthma Action Plan reviewed with patient (and caregiver if necessary) on the phone and mailed copy to patient or submitted via [employer].     Signatures:  Provider  Carol Ellsworth MD   Patient Caretaker

## (undated) NOTE — LETTER
Date: 4/29/2023    Patient Name: Beck Li          To Whom it may concern: This letter has been written at the patient's request. The above patient was seen at the [facility] for treatment of a medical condition.     This patient should be excused from attending work 4/29/2023-4/30/2003        Sincerely,         MELISSA Carrillo

## (undated) NOTE — LETTER
Date & Time: 9/16/2019, 7:41 PM  Patient: Emiliaura Blazing  Encounter Provider(s):    JOSÉ MIGUEL Parson       To Whom It May Concern:    Shaquille Bustamante was seen and treated in our department on 9/16/2019.   Patient may return to work on Friday if feeling bet

## (undated) NOTE — LETTER
ASTHMA ACTION PLAN for Sweetie Joseph     : 10/30/1965     Date: 11/10/2020  Provider:  Reji Keenan MD  Phone for doctor or clinic: Quentin Thompson, [address] [place] [address] [phone]